# Patient Record
Sex: MALE | Race: WHITE | NOT HISPANIC OR LATINO
[De-identification: names, ages, dates, MRNs, and addresses within clinical notes are randomized per-mention and may not be internally consistent; named-entity substitution may affect disease eponyms.]

---

## 2017-05-20 ENCOUNTER — TRANSCRIPTION ENCOUNTER (OUTPATIENT)
Age: 57
End: 2017-05-20

## 2017-12-19 ENCOUNTER — OTHER (OUTPATIENT)
Age: 57
End: 2017-12-19

## 2017-12-22 ENCOUNTER — RECORD ABSTRACTING (OUTPATIENT)
Age: 57
End: 2017-12-22

## 2017-12-22 ENCOUNTER — APPOINTMENT (OUTPATIENT)
Dept: INTERNAL MEDICINE | Facility: CLINIC | Age: 57
End: 2017-12-22
Payer: COMMERCIAL

## 2017-12-22 VITALS
OXYGEN SATURATION: 95 % | SYSTOLIC BLOOD PRESSURE: 138 MMHG | HEART RATE: 58 BPM | WEIGHT: 222 LBS | DIASTOLIC BLOOD PRESSURE: 78 MMHG | BODY MASS INDEX: 31.78 KG/M2 | TEMPERATURE: 98 F | RESPIRATION RATE: 18 BRPM | HEIGHT: 70 IN

## 2017-12-22 DIAGNOSIS — Z87.891 PERSONAL HISTORY OF NICOTINE DEPENDENCE: ICD-10-CM

## 2017-12-22 DIAGNOSIS — J45.909 UNSPECIFIED ASTHMA, UNCOMPLICATED: ICD-10-CM

## 2017-12-22 DIAGNOSIS — E66.9 OBESITY, UNSPECIFIED: ICD-10-CM

## 2017-12-22 DIAGNOSIS — M25.511 PAIN IN RIGHT SHOULDER: ICD-10-CM

## 2017-12-22 DIAGNOSIS — M54.9 DORSALGIA, UNSPECIFIED: ICD-10-CM

## 2017-12-22 DIAGNOSIS — Z72.0 TOBACCO USE: ICD-10-CM

## 2017-12-22 DIAGNOSIS — Z84.89 FAMILY HISTORY OF OTHER SPECIFIED CONDITIONS: ICD-10-CM

## 2017-12-22 DIAGNOSIS — Z82.3 FAMILY HISTORY OF STROKE: ICD-10-CM

## 2017-12-22 DIAGNOSIS — R09.02 HYPOXEMIA: ICD-10-CM

## 2017-12-22 PROCEDURE — 99396 PREV VISIT EST AGE 40-64: CPT | Mod: 25

## 2017-12-22 PROCEDURE — 90686 IIV4 VACC NO PRSV 0.5 ML IM: CPT | Mod: GA

## 2017-12-22 PROCEDURE — G0008: CPT

## 2018-01-10 LAB
CHOLEST SERPL-MCNC: 183
GLUCOSE SERPL-MCNC: 104
HDLC SERPL-MCNC: 36
LDLC SERPL CALC-MCNC: 130
PSA SERPL-MCNC: 0.3

## 2018-06-22 ENCOUNTER — NON-APPOINTMENT (OUTPATIENT)
Age: 58
End: 2018-06-22

## 2018-06-22 ENCOUNTER — APPOINTMENT (OUTPATIENT)
Dept: INTERNAL MEDICINE | Facility: CLINIC | Age: 58
End: 2018-06-22
Payer: COMMERCIAL

## 2018-06-22 VITALS
HEART RATE: 55 BPM | WEIGHT: 220 LBS | TEMPERATURE: 98.4 F | RESPIRATION RATE: 18 BRPM | HEIGHT: 70 IN | BODY MASS INDEX: 31.5 KG/M2 | SYSTOLIC BLOOD PRESSURE: 112 MMHG | OXYGEN SATURATION: 97 % | DIASTOLIC BLOOD PRESSURE: 68 MMHG

## 2018-06-22 DIAGNOSIS — J98.01 ACUTE BRONCHOSPASM: ICD-10-CM

## 2018-06-22 DIAGNOSIS — R00.2 PALPITATIONS: ICD-10-CM

## 2018-06-22 DIAGNOSIS — J98.4 OTHER DISORDERS OF LUNG: ICD-10-CM

## 2018-06-22 PROCEDURE — 93000 ELECTROCARDIOGRAM COMPLETE: CPT

## 2018-06-22 PROCEDURE — 94060 EVALUATION OF WHEEZING: CPT

## 2018-06-22 PROCEDURE — 94729 DIFFUSING CAPACITY: CPT

## 2018-06-22 PROCEDURE — ZZZZZ: CPT

## 2018-06-22 PROCEDURE — 94727 GAS DIL/WSHOT DETER LNG VOL: CPT

## 2018-06-22 PROCEDURE — 99214 OFFICE O/P EST MOD 30 MIN: CPT | Mod: 25

## 2018-06-22 RX ORDER — HYDROCORTISONE 1 %
100 MCG OINTMENT (GRAM) TOPICAL
Refills: 0 | Status: DISCONTINUED | COMMUNITY
End: 2018-06-22

## 2018-06-22 NOTE — PLAN
[FreeTextEntry1] : 1. Continue to observe without medications at this time.\par \par 2. Continue with cardiovascular exercise regimen.\par \par 3. The patient has been referred to Dr. Palla for a baseline cardiology consultation and assessment due to the fact that he has a strong family history of coronary artery disease. Patient's father had quadruple bypass performed in his early 60s.\par \par 4. The patient will call the office in July to assess the availability of the new shingles vaccine.\par \par 5. Follow up with myself in 6 months with a wellness evaluation. He will undergo all yearly routine fasting blood work several days prior to that visit.

## 2018-06-22 NOTE — DATA REVIEWED
[FreeTextEntry1] : A pulmonary function test is performed. Lung volumes and flow rates are within normal limits. Bronchodilator reactivity is not demonstrated. The DLCO and saturation maintained. This represents normal physiologic function.\par \par EKG shows sinus bradycardia rate of 55. Normal intervals. There is a slight left axis deviation. Nonspecific T-wave flattening is noted. There were no acute changes seen.\par \par

## 2018-06-22 NOTE — PHYSICAL EXAM
[General Appearance - Alert] : alert [General Appearance - In No Acute Distress] : in no acute distress [Outer Ear] : the ears and nose were normal in appearance [Oropharynx] : the oropharynx was normal [FreeTextEntry1] : There is significant cerumen noted in the right external auditory canal [Neck Appearance] : the appearance of the neck was normal [Neck Cervical Mass (___cm)] : no neck mass was observed [Jugular Venous Distention Increased] : there was no jugular-venous distention [Thyroid Diffuse Enlargement] : the thyroid was not enlarged [Thyroid Nodule] : there were no palpable thyroid nodules [Auscultation Breath Sounds / Voice Sounds] : lungs were clear to auscultation bilaterally [Heart Rate And Rhythm] : heart rate was normal and rhythm regular [Heart Sounds] : normal S1 and S2 [Heart Sounds Gallop] : no gallops [Murmurs] : no murmurs [Heart Sounds Pericardial Friction Rub] : no pericardial rub [Arterial Pulses Carotid] : carotid pulses were normal with no bruits [Edema] : there was no peripheral edema [Bowel Sounds] : normal bowel sounds [Abdomen Soft] : soft [Abdomen Tenderness] : non-tender [] : no hepato-splenomegaly [Abdomen Mass (___ Cm)] : no abdominal mass palpated [Cervical Lymph Nodes Enlarged Posterior Bilaterally] : posterior cervical [Cervical Lymph Nodes Enlarged Anterior Bilaterally] : anterior cervical [Supraclavicular Lymph Nodes Enlarged Bilaterally] : supraclavicular [Nail Clubbing] : no clubbing  or cyanosis of the fingernails

## 2018-07-01 ENCOUNTER — TRANSCRIPTION ENCOUNTER (OUTPATIENT)
Age: 58
End: 2018-07-01

## 2018-07-06 ENCOUNTER — TRANSCRIPTION ENCOUNTER (OUTPATIENT)
Age: 58
End: 2018-07-06

## 2018-08-13 ENCOUNTER — TRANSCRIPTION ENCOUNTER (OUTPATIENT)
Age: 58
End: 2018-08-13

## 2019-01-11 ENCOUNTER — APPOINTMENT (OUTPATIENT)
Dept: INTERNAL MEDICINE | Facility: CLINIC | Age: 59
End: 2019-01-11

## 2019-05-28 ENCOUNTER — TRANSCRIPTION ENCOUNTER (OUTPATIENT)
Age: 59
End: 2019-05-28

## 2019-08-19 ENCOUNTER — APPOINTMENT (OUTPATIENT)
Dept: INTERNAL MEDICINE | Facility: CLINIC | Age: 59
End: 2019-08-19

## 2019-09-02 PROBLEM — Z84.89 FAMILY HISTORY OF DEATH OF UNKNOWN CAUSE: Status: ACTIVE | Noted: 2017-12-22

## 2019-09-13 ENCOUNTER — APPOINTMENT (OUTPATIENT)
Dept: INTERNAL MEDICINE | Facility: CLINIC | Age: 59
End: 2019-09-13
Payer: COMMERCIAL

## 2019-09-13 VITALS
RESPIRATION RATE: 16 BRPM | BODY MASS INDEX: 30.78 KG/M2 | DIASTOLIC BLOOD PRESSURE: 62 MMHG | HEIGHT: 70 IN | TEMPERATURE: 98.6 F | HEART RATE: 54 BPM | OXYGEN SATURATION: 96 % | WEIGHT: 215 LBS | SYSTOLIC BLOOD PRESSURE: 114 MMHG

## 2019-09-13 DIAGNOSIS — M77.9 ENTHESOPATHY, UNSPECIFIED: ICD-10-CM

## 2019-09-13 DIAGNOSIS — Z23 ENCOUNTER FOR IMMUNIZATION: ICD-10-CM

## 2019-09-13 PROCEDURE — 99396 PREV VISIT EST AGE 40-64: CPT | Mod: 25

## 2019-09-13 PROCEDURE — 90686 IIV4 VACC NO PRSV 0.5 ML IM: CPT

## 2019-09-13 PROCEDURE — G0008: CPT

## 2019-09-13 NOTE — HISTORY OF PRESENT ILLNESS
[de-identified] : The patient comes in today for a wellness evaluation. \par \par Overall, the patient is stable at this time. He is not taking any medications. He states that he is attempting to exercise on a fairly regular basis. He has lost 5 pounds since last visit. He denies any chest pains or cardiovascular symptoms while exercising. There are no fevers or night sweats. He does not have any cough or sputum production. There is no overt wheezing. His appetite is good. There is no change in bowel habits. \par \par The patient has been complaining of discomfort to his right arm localized to the flexor tendon that started a couple weeks ago. He notes that the onset of the pain was with flexion of the arm inwards. He now comes in for this assessment. \par \par

## 2019-09-13 NOTE — PHYSICAL EXAM
[General Appearance - Alert] : alert [General Appearance - In No Acute Distress] : in no acute distress [Sclera] : the sclera and conjunctiva were normal [PERRL With Normal Accommodation] : pupils were equal in size, round, and reactive to light [Extraocular Movements] : extraocular movements were intact [Outer Ear] : the ears and nose were normal in appearance [Oropharynx] : the oropharynx was normal [Neck Appearance] : the appearance of the neck was normal [Neck Cervical Mass (___cm)] : no neck mass was observed [Thyroid Nodule] : there were no palpable thyroid nodules [Jugular Venous Distention Increased] : there was no jugular-venous distention [Thyroid Diffuse Enlargement] : the thyroid was not enlarged [Auscultation Breath Sounds / Voice Sounds] : lungs were clear to auscultation bilaterally [Heart Rate And Rhythm] : heart rate was normal and rhythm regular [Heart Sounds] : normal S1 and S2 [Heart Sounds Gallop] : no gallops [Murmurs] : no murmurs [Heart Sounds Pericardial Friction Rub] : no pericardial rub [Arterial Pulses Carotid] : carotid pulses were normal with no bruits [Edema] : there was no peripheral edema [Bowel Sounds] : normal bowel sounds [Abdomen Tenderness] : non-tender [Abdomen Soft] : soft [Abdomen Mass (___ Cm)] : no abdominal mass palpated [Normal Sphincter Tone] : normal sphincter tone [No Rectal Mass] : no rectal mass [Prostate Enlargement] : the prostate was not enlarged [Prostate Tenderness] : the prostate was not tender [Cervical Lymph Nodes Enlarged Posterior Bilaterally] : posterior cervical [Cervical Lymph Nodes Enlarged Anterior Bilaterally] : anterior cervical [No CVA Tenderness] : no ~M costovertebral angle tenderness [Supraclavicular Lymph Nodes Enlarged Bilaterally] : supraclavicular [Nail Clubbing] : no clubbing  or cyanosis of the fingernails [] : no rash [Occult Blood Positive] : stool was negative for occult blood [FreeTextEntry1] : Seborrheic keratoses are present. There are no suspicious looking hyperpigmented nevi.

## 2019-09-13 NOTE — HEALTH RISK ASSESSMENT
[Very Good] : ~his/her~  mood as very good [Yes] : Yes [Monthly or less (1 pt)] : Monthly or less (1 point) [1 or 2 (0 pts)] : 1 or 2 (0 points) [Never (0 pts)] : Never (0 points) [No] : In the past 12 months have you used drugs other than those required for medical reasons? No [No falls in past year] : Patient reported no falls in the past year [0] : 2) Feeling down, depressed, or hopeless: Not at all (0) [Patient reported colonoscopy was normal] : Patient reported colonoscopy was normal [With Significant Other] : lives with significant other [None] : None [Employed] : employed [] :  [Feels Safe at Home] : Feels safe at home [Fully functional (bathing, dressing, toileting, transferring, walking, feeding)] : Fully functional (bathing, dressing, toileting, transferring, walking, feeding) [Fully functional (using the telephone, shopping, preparing meals, housekeeping, doing laundry, using] : Fully functional and needs no help or supervision to perform IADLs (using the telephone, shopping, preparing meals, housekeeping, doing laundry, using transportation, managing medications and managing finances) [Reports normal functional visual acuity (ie: able to read med bottle)] : Reports normal functional visual acuity [Smoke Detector] : smoke detector [Carbon Monoxide Detector] : carbon monoxide detector [Guns at Home] : guns at home [Seat Belt] :  uses seat belt [Sunscreen] : uses sunscreen [] : No [de-identified] : former smoker  [YearQuit] : 2009 [de-identified] : occasional  [Change in mental status noted] : No change in mental status noted [Language] : denies difficulty with language [Behavior] : denies difficulty with behavior [Learning/Retaining New Information] : denies difficulty learning/retaining new information [Handling Complex Tasks] : denies difficulty handling complex tasks [Reasoning] : denies difficulty with reasoning [Spatial Ability and Orientation] : denies difficulty with spatial ability and orientation [Reports changes in hearing] : Reports no changes in hearing [Reports changes in vision] : Reports no changes in vision [Reports changes in dental health] : Reports no changes in dental health [Travel to Developing Areas] : does not  travel to developing areas [TB Exposure] : is not being exposed to tuberculosis [Caregiver Concerns] : does not have caregiver concerns [ColonoscopyDate] : 01/15 [ColonoscopyComments] : with Dr. 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

## 2019-09-13 NOTE — ADDENDUM
I saw and evaluated the patient, performing the key elements of the service. I discussed the findings, assessment and plan with the resident and agree with the resident's findings and plan as documented in the resident's note. [FreeTextEntry1] : I, Usama Tirado, acted solely as a scribe for Dr. Hughes on this date 09/13/2019.

## 2019-09-13 NOTE — END OF VISIT
[FreeTextEntry3] : I, Usama Tirado, acted solely as a scribe for Dr. Danny Hughes MD on this date 09/13/2019. \par \par All medical records entries made by the Scribe were at my, Dr. Danny Hughes MD, direction and personally dictated by me on 09/13/2019. I have personally reviewed the chart and agree that the record accurately reflects my personal performance of the history, physical exam, assessment, and plan. I have also personally directed, reviewed, and agreed with the chart.

## 2019-09-13 NOTE — PLAN
[FreeTextEntry1] : 1. Continue to observe without medications at this time.\par \par 2. Flu shot was given today.\par \par 3. The new shingles vaccine has been recommended. He will call his insurance company to see if it's covered.\par \par 4. For the arm pain, I recommended to take two Aleve tablets with food as needed for 10 days. I recommended to minimize activity with the right upper extremity. \par \par 5. Continue with cardiovascular exercise regimen.\par \par 6. The patient is due for a colonoscopy with gastroenterologist Dr. Amanda.\par \par 7. The patient was referred to dermatologist Dr. Kirkpatrick or Dr. Polk. \par \par 8. The patient was referred to cardiologist Dr. Francisco for consultation and assessment due to the fact that he has a strong family history of coronary artery disease.\par \par 9. The patient was given a prescription for Carotid Duplex Dopplers to evaluate for the presence of atherosclerotic disease. He has risk factors including a strong family history of coronary artery disease. \par \par 10. Follow up with myself in a year with a wellness evaluation. He will undergo all yearly routine fasting blood work several days prior to that visit.

## 2019-10-04 ENCOUNTER — TRANSCRIPTION ENCOUNTER (OUTPATIENT)
Age: 59
End: 2019-10-04

## 2020-06-01 ENCOUNTER — APPOINTMENT (OUTPATIENT)
Dept: INTERNAL MEDICINE | Facility: CLINIC | Age: 60
End: 2020-06-01
Payer: COMMERCIAL

## 2020-06-01 VITALS
SYSTOLIC BLOOD PRESSURE: 112 MMHG | BODY MASS INDEX: 32.78 KG/M2 | HEIGHT: 70 IN | HEART RATE: 57 BPM | OXYGEN SATURATION: 97 % | TEMPERATURE: 97.7 F | DIASTOLIC BLOOD PRESSURE: 70 MMHG | RESPIRATION RATE: 18 BRPM | WEIGHT: 229 LBS

## 2020-06-01 DIAGNOSIS — J30.9 ALLERGIC RHINITIS, UNSPECIFIED: ICD-10-CM

## 2020-06-01 DIAGNOSIS — J45.20 MILD INTERMITTENT ASTHMA, UNCOMPLICATED: ICD-10-CM

## 2020-06-01 PROCEDURE — 99214 OFFICE O/P EST MOD 30 MIN: CPT

## 2020-06-01 NOTE — HISTORY OF PRESENT ILLNESS
[FreeTextEntry8] : Sinus congestion.\par Developed sinus congestion several weeks ago.  He does have some allergy issues in the spring but usually not this bad.  He denies cough, fevers, wheeze.  Taking OTC Allergy med without effect.  He has been using a nasal decongestant frequently.

## 2020-06-01 NOTE — PHYSICAL EXAM
[Normal Oropharynx] : the oropharynx was normal [Normal Outer Ear/Nose] : the outer ears and nose were normal in appearance [No Edema] : there was no peripheral edema [Normal] : affect was normal and insight and judgment were intact

## 2020-06-01 NOTE — ASSESSMENT
[FreeTextEntry1] : \par Start prednisone 20 mg bid x 7 days. with food\par Doxycycline- avoid sun\par Fluticasone NS qd.  Stop OTC nasal decongestant.  \par \par Call if not improved.

## 2020-08-10 ENCOUNTER — RX RENEWAL (OUTPATIENT)
Age: 60
End: 2020-08-10

## 2020-10-08 ENCOUNTER — LABORATORY RESULT (OUTPATIENT)
Age: 60
End: 2020-10-08

## 2020-10-12 ENCOUNTER — APPOINTMENT (OUTPATIENT)
Dept: INTERNAL MEDICINE | Facility: CLINIC | Age: 60
End: 2020-10-12
Payer: COMMERCIAL

## 2020-10-12 VITALS
RESPIRATION RATE: 18 BRPM | BODY MASS INDEX: 32.64 KG/M2 | DIASTOLIC BLOOD PRESSURE: 78 MMHG | HEART RATE: 62 BPM | WEIGHT: 228 LBS | HEIGHT: 70 IN | OXYGEN SATURATION: 97 % | SYSTOLIC BLOOD PRESSURE: 118 MMHG | TEMPERATURE: 97.7 F

## 2020-10-12 PROCEDURE — 90686 IIV4 VACC NO PRSV 0.5 ML IM: CPT

## 2020-10-12 PROCEDURE — 99396 PREV VISIT EST AGE 40-64: CPT | Mod: 25

## 2020-10-12 PROCEDURE — G0008: CPT

## 2020-10-12 RX ORDER — DOXYCYCLINE HYCLATE 100 MG/1
100 TABLET ORAL TWICE DAILY
Qty: 14 | Refills: 0 | Status: DISCONTINUED | COMMUNITY
Start: 2020-06-01 | End: 2020-10-12

## 2020-10-12 RX ORDER — PREDNISONE 20 MG/1
20 TABLET ORAL
Qty: 14 | Refills: 0 | Status: DISCONTINUED | COMMUNITY
Start: 2020-06-01 | End: 2020-10-12

## 2020-10-12 NOTE — HISTORY OF PRESENT ILLNESS
[de-identified] : The patient comes in today for a wellness evaluation.\par \par Overall, the patient has been relatively stable, since last being seen by myself one year ago. He did have a sinus infection in June. He was treated with fluticasone nasal spray with significant improvement. He has continued on this medication due to the fact that he has had a positive response.\par \par The patient has not been exercising recently, due to the current corona virus epidemic. Lesions have been closed. He has gained approximately 8 pounds. He does snore fairly regularly according to his wife. His next comfort to 17-1/2 inches. He never went for his overnight polysomnogram.\par \par The patient did undergo colonoscopy 2 weeks ago. The study did not reveal any polyps. He denies any change in bowel habits. There is no chest pain. He still has not undergone a cardiology evaluation. He does have a strong family history. He did not followup with dermatology. He also did not undergo the shingles vaccine. He now comes in for this assessment.

## 2020-10-12 NOTE — PHYSICAL EXAM
[General Appearance - Alert] : alert [General Appearance - In No Acute Distress] : in no acute distress [Sclera] : the sclera and conjunctiva were normal [PERRL With Normal Accommodation] : pupils were equal in size, round, and reactive to light [Extraocular Movements] : extraocular movements were intact [Outer Ear] : the ears and nose were normal in appearance [Oropharynx] : the oropharynx was normal [Neck Appearance] : the appearance of the neck was normal [Neck Cervical Mass (___cm)] : no neck mass was observed [Jugular Venous Distention Increased] : there was no jugular-venous distention [Thyroid Diffuse Enlargement] : the thyroid was not enlarged [Thyroid Nodule] : there were no palpable thyroid nodules [Auscultation Breath Sounds / Voice Sounds] : lungs were clear to auscultation bilaterally [Heart Rate And Rhythm] : heart rate was normal and rhythm regular [Heart Sounds] : normal S1 and S2 [Heart Sounds Gallop] : no gallops [Murmurs] : no murmurs [Heart Sounds Pericardial Friction Rub] : no pericardial rub [Arterial Pulses Carotid] : carotid pulses were normal with no bruits [Edema] : there was no peripheral edema [Bowel Sounds] : normal bowel sounds [Abdomen Soft] : soft [Abdomen Tenderness] : non-tender [Abdomen Mass (___ Cm)] : no abdominal mass palpated [Normal Sphincter Tone] : normal sphincter tone [No Rectal Mass] : no rectal mass [Occult Blood Positive] : stool was negative for occult blood [Prostate Enlargement] : the prostate was not enlarged [Prostate Tenderness] : the prostate was not tender [Cervical Lymph Nodes Enlarged Posterior Bilaterally] : posterior cervical [Cervical Lymph Nodes Enlarged Anterior Bilaterally] : anterior cervical [Supraclavicular Lymph Nodes Enlarged Bilaterally] : supraclavicular [No CVA Tenderness] : no ~M costovertebral angle tenderness [Nail Clubbing] : no clubbing  or cyanosis of the fingernails [] : no rash [FreeTextEntry1] : Seborrheic keratoses are present. There are no suspicious looking hyperpigmented nevi.

## 2020-10-12 NOTE — PLAN
[FreeTextEntry1] : 1. Continued medication as outlined above.\par \par 2. The patient now agrees to undergo an overnight polysomnography to evaluate for possible sleep apnea.\par \par 3. The patient will now once again be scheduled for carotid duplex studies as this still has not yet been performed. The patient does have risk factors.\par \par 4. The patient has been referred for a baseline cardiology evaluation with Dr. Francisco. \par \par 5. Dermatologic assessment with Dr. Kirkpatrick\par \par 6. The patient once again we'll attempt to look into the coverage for the new shingles vaccine. He will then decide upon administration\par \par 7. Flu shot has been administered\par \par 8. Followup in one year with a wellness evaluation, routine blood work, flu shot, and Pneumovax 23.

## 2020-10-12 NOTE — HEALTH RISK ASSESSMENT
[Yes] : Yes [2 - 3 times a week (3 pts)] : 2 - 3  times a week (3 points) [3 or 4 (1 pt)] : 3 or 4  (1 point) [Never (0 pts)] : Never (0 points) [No] : In the past 12 months have you used drugs other than those required for medical reasons? No [0] : 2) Feeling down, depressed, or hopeless: Not at all (0) [Employed] : employed [Smoke Detector] : smoke detector [Carbon Monoxide Detector] : carbon monoxide detector [Safety elements used in home] : safety elements used in home [Seat Belt] :  uses seat belt [Sunscreen] : uses sunscreen [] : No [de-identified] : former  [YearQuit] : 2010 [Guns at Home] : no guns at home [ColonoscopyDate] : 09/20

## 2020-11-23 ENCOUNTER — MED ADMIN CHARGE (OUTPATIENT)
Age: 60
End: 2020-11-23

## 2020-11-23 ENCOUNTER — APPOINTMENT (OUTPATIENT)
Dept: INTERNAL MEDICINE | Facility: CLINIC | Age: 60
End: 2020-11-23
Payer: COMMERCIAL

## 2020-11-23 PROCEDURE — 90715 TDAP VACCINE 7 YRS/> IM: CPT

## 2020-11-23 PROCEDURE — 90471 IMMUNIZATION ADMIN: CPT

## 2021-01-14 ENCOUNTER — APPOINTMENT (OUTPATIENT)
Dept: INTERNAL MEDICINE | Facility: CLINIC | Age: 61
End: 2021-01-14
Payer: COMMERCIAL

## 2021-01-14 PROCEDURE — 99442: CPT

## 2021-01-14 RX ORDER — CEFUROXIME AXETIL 500 MG/1
500 TABLET ORAL
Qty: 14 | Refills: 0 | Status: COMPLETED | COMMUNITY
Start: 2021-01-14 | End: 2021-01-21

## 2021-01-14 NOTE — HISTORY OF PRESENT ILLNESS
[Other Location: e.g. School (Enter Location, City,State)___] : at [unfilled], at the time of the visit. [Medical Office: ([facility])___] : at the medical office located in  [Verbal consent obtained from patient] : the patient, [unfilled] [FreeTextEntry8] : Possible sinus infection\par Started week ago with nasal congestion,yellow discharge and sinus tenderness.  NO fvers, rare cough,noSOB or wheeze.  Denies COVID contacts.  Using Flonase.Feels like similar Sinus infections.

## 2021-01-18 ENCOUNTER — APPOINTMENT (OUTPATIENT)
Dept: INTERNAL MEDICINE | Facility: CLINIC | Age: 61
End: 2021-01-18

## 2021-10-12 ENCOUNTER — LABORATORY RESULT (OUTPATIENT)
Age: 61
End: 2021-10-12

## 2021-10-14 LAB
25(OH)D3 SERPL-MCNC: 41 NG/ML
ALBUMIN SERPL ELPH-MCNC: 4.9 G/DL
ALP BLD-CCNC: 67 U/L
ALT SERPL-CCNC: 23 U/L
ANION GAP SERPL CALC-SCNC: 13 MMOL/L
APPEARANCE: CLEAR
AST SERPL-CCNC: 19 U/L
BACTERIA: NEGATIVE
BASOPHILS # BLD AUTO: 0.04 K/UL
BASOPHILS NFR BLD AUTO: 0.5 %
BILIRUB SERPL-MCNC: 0.8 MG/DL
BILIRUBIN URINE: NEGATIVE
BLOOD URINE: NEGATIVE
BUN SERPL-MCNC: 15 MG/DL
CALCIUM SERPL-MCNC: 9.7 MG/DL
CHLORIDE SERPL-SCNC: 100 MMOL/L
CHOLEST SERPL-MCNC: 200 MG/DL
CO2 SERPL-SCNC: 26 MMOL/L
COLOR: YELLOW
CREAT SERPL-MCNC: 1.17 MG/DL
EOSINOPHIL # BLD AUTO: 0.13 K/UL
EOSINOPHIL NFR BLD AUTO: 1.7 %
GLUCOSE QUALITATIVE U: NEGATIVE
GLUCOSE SERPL-MCNC: 114 MG/DL
HCT VFR BLD CALC: 46.7 %
HDLC SERPL-MCNC: 44 MG/DL
HGB BLD-MCNC: 15.9 G/DL
HYALINE CASTS: 0 /LPF
IMM GRANULOCYTES NFR BLD AUTO: 0.4 %
KETONES URINE: NEGATIVE
LDLC SERPL CALC-MCNC: 135 MG/DL
LEUKOCYTE ESTERASE URINE: NEGATIVE
LYMPHOCYTES # BLD AUTO: 1.33 K/UL
LYMPHOCYTES NFR BLD AUTO: 17.4 %
MAN DIFF?: NORMAL
MCHC RBC-ENTMCNC: 29.7 PG
MCHC RBC-ENTMCNC: 34 GM/DL
MCV RBC AUTO: 87.1 FL
MICROSCOPIC-UA: NORMAL
MONOCYTES # BLD AUTO: 0.72 K/UL
MONOCYTES NFR BLD AUTO: 9.4 %
NEUTROPHILS # BLD AUTO: 5.38 K/UL
NEUTROPHILS NFR BLD AUTO: 70.6 %
NITRITE URINE: NEGATIVE
NONHDLC SERPL-MCNC: 156 MG/DL
PH URINE: 6
PLATELET # BLD AUTO: 280 K/UL
POTASSIUM SERPL-SCNC: 4.3 MMOL/L
PROT SERPL-MCNC: 6.9 G/DL
PROTEIN URINE: NORMAL
PSA SERPL-MCNC: 0.32 NG/ML
RBC # BLD: 5.36 M/UL
RBC # FLD: 12.9 %
RED BLOOD CELLS URINE: 2 /HPF
SODIUM SERPL-SCNC: 139 MMOL/L
SPECIFIC GRAVITY URINE: 1.02
SQUAMOUS EPITHELIAL CELLS: 0 /HPF
T3FREE SERPL-MCNC: 3.25 PG/ML
T3RU NFR SERPL: 1.1 TBI
T4 FREE SERPL-MCNC: 1 NG/DL
T4 SERPL-MCNC: 5.2 UG/DL
TRIGL SERPL-MCNC: 104 MG/DL
TSH SERPL-ACNC: 1.63 UIU/ML
UROBILINOGEN URINE: NORMAL
WBC # FLD AUTO: 7.63 K/UL
WHITE BLOOD CELLS URINE: 1 /HPF

## 2021-10-15 ENCOUNTER — APPOINTMENT (OUTPATIENT)
Dept: CARDIOLOGY | Facility: CLINIC | Age: 61
End: 2021-10-15
Payer: COMMERCIAL

## 2021-10-15 ENCOUNTER — NON-APPOINTMENT (OUTPATIENT)
Age: 61
End: 2021-10-15

## 2021-10-15 ENCOUNTER — APPOINTMENT (OUTPATIENT)
Dept: INTERNAL MEDICINE | Facility: CLINIC | Age: 61
End: 2021-10-15
Payer: COMMERCIAL

## 2021-10-15 VITALS
WEIGHT: 227 LBS | BODY MASS INDEX: 32.5 KG/M2 | SYSTOLIC BLOOD PRESSURE: 133 MMHG | OXYGEN SATURATION: 96 % | DIASTOLIC BLOOD PRESSURE: 83 MMHG | HEART RATE: 58 BPM | HEIGHT: 70 IN

## 2021-10-15 VITALS
SYSTOLIC BLOOD PRESSURE: 128 MMHG | OXYGEN SATURATION: 96 % | WEIGHT: 225 LBS | DIASTOLIC BLOOD PRESSURE: 82 MMHG | TEMPERATURE: 98.2 F | HEIGHT: 70 IN | RESPIRATION RATE: 16 BRPM | BODY MASS INDEX: 32.21 KG/M2 | HEART RATE: 60 BPM

## 2021-10-15 DIAGNOSIS — Z91.89 OTHER SPECIFIED PERSONAL RISK FACTORS, NOT ELSEWHERE CLASSIFIED: ICD-10-CM

## 2021-10-15 DIAGNOSIS — R94.31 ABNORMAL ELECTROCARDIOGRAM [ECG] [EKG]: ICD-10-CM

## 2021-10-15 DIAGNOSIS — R06.00 DYSPNEA, UNSPECIFIED: ICD-10-CM

## 2021-10-15 PROCEDURE — 99204 OFFICE O/P NEW MOD 45 MIN: CPT

## 2021-10-15 PROCEDURE — 93000 ELECTROCARDIOGRAM COMPLETE: CPT

## 2021-10-15 PROCEDURE — G0009: CPT

## 2021-10-15 PROCEDURE — 90472 IMMUNIZATION ADMIN EACH ADD: CPT

## 2021-10-15 PROCEDURE — 90732 PPSV23 VACC 2 YRS+ SUBQ/IM: CPT

## 2021-10-15 PROCEDURE — 90686 IIV4 VACC NO PRSV 0.5 ML IM: CPT

## 2021-10-15 PROCEDURE — 99396 PREV VISIT EST AGE 40-64: CPT | Mod: 25

## 2021-10-15 RX ORDER — FLUTICASONE PROPIONATE 50 UG/1
50 SPRAY, METERED NASAL DAILY
Qty: 16 | Refills: 1 | Status: DISCONTINUED | COMMUNITY
Start: 2020-06-01 | End: 2021-10-15

## 2021-10-15 NOTE — HISTORY OF PRESENT ILLNESS
[FreeTextEntry1] : The patient comes in for a yearly wellness evaluation\par  [de-identified] : The patient states, and overall, he is doing relatively well. Unfortunately, he has not followed through with any of the recommendations that I have been taking over the past several years. He did, however, just see a cardiologist earlier today, due to a very strong family history of early coronary artery disease. He was evaluated by Dr. Francisco. He is attempting to set up a coronary CT angiogram for the patient. He is asymptomatic at present. He has not been exercising. He has not been able to lose weight.\par \par The patient has received vaccination for the corona virus. He is asymptomatic at present. He still did not undergo an overnight polysomnogram to evaluate for sleep apnea. He now comes in for this assessment.

## 2021-10-15 NOTE — REVIEW OF SYSTEMS
[Weight Gain (___ Lbs)] : [unfilled] ~Ulb weight gain [Feeling Fatigued] : feeling fatigued [Dyspnea on exertion] : dyspnea during exertion [Negative] : Heme/Lymph

## 2021-10-15 NOTE — PHYSICAL EXAM
[No Acute Distress] : no acute distress [Obese] : obese [Normal S1, S2] : normal S1, S2 [No Murmur] : no murmur [Clear Lung Fields] : clear lung fields [Soft] : abdomen soft [Normal Gait] : normal gait [No Edema] : no edema [No Rash] : no rash [Moves all extremities] : moves all extremities [Alert and Oriented] : alert and oriented [de-identified] : Round pupils [de-identified] : Wearing a face mask [de-identified] : No JVD

## 2021-10-15 NOTE — HISTORY OF PRESENT ILLNESS
[FreeTextEntry1] : Zeb Mata is a 60-year-old man with obesity, former tobacco use, untreated hyperlipidemia, and family history of premature and severe coronary artery disease in multiple male relatives (including his father, brother, and paternal uncle) who presents for cardiology consultation because of fatigue and concern for coronary artery disease.  He describes a sedentary lifestyle for the past 1.5 years; he has not been experiencing typical angina but describes recent onset of fatigue and exertional dyspnea.  He denies previous assessments for coronary artery disease.

## 2021-10-15 NOTE — DISCUSSION/SUMMARY
[FreeTextEntry1] : Zeb Mata is a 60-year-old man with multiple risk factors for heart disease (including family history of premature CAD in multiple canal first degree relatives [requiring revascularization]) who is experiencing fatigue and exertional dyspnea in the setting of nonspecific ECG abnormality.  I recommend CT coronaries with calcium scoring to further evaluate his symptoms.  In addition, I recommend aggressive risk factor modification and would favor statin therapy (especially if coronary CT is abnormal), lifestyle modification, and weight loss. I will contact him by telephone to discuss results of imaging when available.

## 2021-10-15 NOTE — CARDIOLOGY SUMMARY
[de-identified] : 10/15/21.  Sinus  Rhythm.  Mild, nonspecific ST depression.  Early R wave transition (V2)

## 2021-10-15 NOTE — PHYSICAL EXAM
[General Appearance - Alert] : alert [General Appearance - In No Acute Distress] : in no acute distress [Sclera] : the sclera and conjunctiva were normal [PERRL With Normal Accommodation] : pupils were equal in size, round, and reactive to light [Extraocular Movements] : extraocular movements were intact [Outer Ear] : the ears and nose were normal in appearance [Oropharynx] : the oropharynx was normal [Neck Cervical Mass (___cm)] : no neck mass was observed [Neck Appearance] : the appearance of the neck was normal [Jugular Venous Distention Increased] : there was no jugular-venous distention [Thyroid Diffuse Enlargement] : the thyroid was not enlarged [Thyroid Nodule] : there were no palpable thyroid nodules [Auscultation Breath Sounds / Voice Sounds] : lungs were clear to auscultation bilaterally [Heart Rate And Rhythm] : heart rate was normal and rhythm regular [Heart Sounds Gallop] : no gallops [Heart Sounds] : normal S1 and S2 [Murmurs] : no murmurs [Heart Sounds Pericardial Friction Rub] : no pericardial rub [Arterial Pulses Carotid] : carotid pulses were normal with no bruits [Bowel Sounds] : normal bowel sounds [Edema] : there was no peripheral edema [Abdomen Soft] : soft [Abdomen Tenderness] : non-tender [Abdomen Mass (___ Cm)] : no abdominal mass palpated [Normal Sphincter Tone] : normal sphincter tone [No Rectal Mass] : no rectal mass [Occult Blood Positive] : stool was negative for occult blood [Prostate Enlargement] : the prostate was not enlarged [Prostate Tenderness] : the prostate was not tender [Cervical Lymph Nodes Enlarged Posterior Bilaterally] : posterior cervical [Cervical Lymph Nodes Enlarged Anterior Bilaterally] : anterior cervical [Supraclavicular Lymph Nodes Enlarged Bilaterally] : supraclavicular [No CVA Tenderness] : no ~M costovertebral angle tenderness [Nail Clubbing] : no clubbing  or cyanosis of the fingernails [] : no rash [FreeTextEntry1] : Seborrheic keratoses are present. There are no suspicious looking hyperpigmented nevi.

## 2021-10-15 NOTE — PLAN
[FreeTextEntry1] : 1. Continue with medication as outlined above which only includes Flonase at this time.\par \par 2. Await the results of the noninvasive cardiac workup that is tentatively been scheduled.\par \par 3. The patient still needs to undergo overnight polysomnogram to evaluate for sleep apnea.\par \par 4. Carotid duplex studies also still need to be performed\par \par 5. Routine dermatologic follow up with Dr. Kirkpatrick\par \par 6. Diet, weight loss and cardiovascular exercise regimen have been stressed\par \par 7. Flu shot has been administered\par \par 8. Followup in one year with a wellness evaluation.

## 2021-11-16 ENCOUNTER — APPOINTMENT (OUTPATIENT)
Dept: CARDIOLOGY | Facility: CLINIC | Age: 61
End: 2021-11-16
Payer: COMMERCIAL

## 2021-11-16 PROCEDURE — 93880 EXTRACRANIAL BILAT STUDY: CPT

## 2021-11-20 ENCOUNTER — OUTPATIENT (OUTPATIENT)
Dept: OUTPATIENT SERVICES | Facility: HOSPITAL | Age: 61
LOS: 1 days | End: 2021-11-20
Payer: COMMERCIAL

## 2021-11-20 ENCOUNTER — APPOINTMENT (OUTPATIENT)
Age: 61
End: 2021-11-20
Payer: COMMERCIAL

## 2021-11-20 DIAGNOSIS — G47.33 OBSTRUCTIVE SLEEP APNEA (ADULT) (PEDIATRIC): ICD-10-CM

## 2021-11-20 PROCEDURE — G0400: CPT

## 2021-11-20 PROCEDURE — 95800 SLP STDY UNATTENDED: CPT

## 2021-11-24 ENCOUNTER — APPOINTMENT (OUTPATIENT)
Dept: CT IMAGING | Facility: CLINIC | Age: 61
End: 2021-11-24

## 2022-01-12 ENCOUNTER — TRANSCRIPTION ENCOUNTER (OUTPATIENT)
Age: 62
End: 2022-01-12

## 2022-03-31 ENCOUNTER — NON-APPOINTMENT (OUTPATIENT)
Age: 62
End: 2022-03-31

## 2022-06-10 ENCOUNTER — RX RENEWAL (OUTPATIENT)
Age: 62
End: 2022-06-10

## 2022-06-13 ENCOUNTER — NON-APPOINTMENT (OUTPATIENT)
Age: 62
End: 2022-06-13

## 2022-09-13 ENCOUNTER — APPOINTMENT (OUTPATIENT)
Dept: DERMATOLOGY | Facility: CLINIC | Age: 62
End: 2022-09-13

## 2022-11-29 ENCOUNTER — APPOINTMENT (OUTPATIENT)
Dept: DERMATOLOGY | Facility: CLINIC | Age: 62
End: 2022-11-29

## 2022-11-29 ENCOUNTER — NON-APPOINTMENT (OUTPATIENT)
Age: 62
End: 2022-11-29

## 2022-11-29 DIAGNOSIS — Z12.83 ENCOUNTER FOR SCREENING FOR MALIGNANT NEOPLASM OF SKIN: ICD-10-CM

## 2022-11-29 DIAGNOSIS — L81.4 OTHER MELANIN HYPERPIGMENTATION: ICD-10-CM

## 2022-11-29 DIAGNOSIS — D22.9 MELANOCYTIC NEVI, UNSPECIFIED: ICD-10-CM

## 2022-11-29 PROCEDURE — 99203 OFFICE O/P NEW LOW 30 MIN: CPT

## 2022-11-29 NOTE — HISTORY OF PRESENT ILLNESS
[FreeTextEntry1] : skin check [de-identified] : 62 year old male here for skin check. no tx tried.

## 2022-12-06 DIAGNOSIS — I25.84 ATHEROSCLEROTIC HEART DISEASE OF NATIVE CORONARY ARTERY W/OUT ANGINA PECTORIS: ICD-10-CM

## 2022-12-06 DIAGNOSIS — I25.10 ATHEROSCLEROTIC HEART DISEASE OF NATIVE CORONARY ARTERY W/OUT ANGINA PECTORIS: ICD-10-CM

## 2022-12-06 LAB
ALBUMIN SERPL ELPH-MCNC: 4.9 G/DL
ALP BLD-CCNC: 61 U/L
ALT SERPL-CCNC: 21 U/L
ANION GAP SERPL CALC-SCNC: 19 MMOL/L
AST SERPL-CCNC: 16 U/L
BILIRUB SERPL-MCNC: 0.6 MG/DL
BUN SERPL-MCNC: 15 MG/DL
CALCIUM SERPL-MCNC: 9.6 MG/DL
CHLORIDE SERPL-SCNC: 99 MMOL/L
CHOLEST SERPL-MCNC: 163 MG/DL
CO2 SERPL-SCNC: 22 MMOL/L
CREAT SERPL-MCNC: 1.02 MG/DL
EGFR: 84 ML/MIN/1.73M2
GLUCOSE SERPL-MCNC: 106 MG/DL
HDLC SERPL-MCNC: 41 MG/DL
LDLC SERPL CALC-MCNC: 107 MG/DL
NONHDLC SERPL-MCNC: 122 MG/DL
POTASSIUM SERPL-SCNC: 4.2 MMOL/L
PROT SERPL-MCNC: 6.7 G/DL
SODIUM SERPL-SCNC: 140 MMOL/L
TRIGL SERPL-MCNC: 74 MG/DL

## 2022-12-07 ENCOUNTER — TRANSCRIPTION ENCOUNTER (OUTPATIENT)
Age: 62
End: 2022-12-07

## 2023-04-16 ENCOUNTER — INPATIENT (INPATIENT)
Facility: HOSPITAL | Age: 63
LOS: 0 days | Discharge: HOME | DRG: 310 | End: 2023-04-17
Attending: HOSPITALIST | Admitting: FAMILY MEDICINE
Payer: COMMERCIAL

## 2023-04-16 VITALS — HEIGHT: 70 IN | WEIGHT: 199.96 LBS

## 2023-04-16 DIAGNOSIS — E78.5 HYPERLIPIDEMIA, UNSPECIFIED: ICD-10-CM

## 2023-04-16 DIAGNOSIS — I25.10 ATHEROSCLEROTIC HEART DISEASE OF NATIVE CORONARY ARTERY WITHOUT ANGINA PECTORIS: ICD-10-CM

## 2023-04-16 DIAGNOSIS — G47.33 OBSTRUCTIVE SLEEP APNEA (ADULT) (PEDIATRIC): ICD-10-CM

## 2023-04-16 DIAGNOSIS — R42 DIZZINESS AND GIDDINESS: ICD-10-CM

## 2023-04-16 DIAGNOSIS — R00.1 BRADYCARDIA, UNSPECIFIED: ICD-10-CM

## 2023-04-16 LAB
ALBUMIN SERPL ELPH-MCNC: 3.9 G/DL — SIGNIFICANT CHANGE UP (ref 3.3–5)
ALP SERPL-CCNC: 50 U/L — SIGNIFICANT CHANGE UP (ref 40–120)
ALT FLD-CCNC: 33 U/L — SIGNIFICANT CHANGE UP (ref 12–78)
ANION GAP SERPL CALC-SCNC: 1 MMOL/L — LOW (ref 5–17)
AST SERPL-CCNC: 9 U/L — LOW (ref 15–37)
BASOPHILS # BLD AUTO: 0.03 K/UL — SIGNIFICANT CHANGE UP (ref 0–0.2)
BASOPHILS NFR BLD AUTO: 0.4 % — SIGNIFICANT CHANGE UP (ref 0–2)
BILIRUB SERPL-MCNC: 0.9 MG/DL — SIGNIFICANT CHANGE UP (ref 0.2–1.2)
BUN SERPL-MCNC: 19 MG/DL — SIGNIFICANT CHANGE UP (ref 7–23)
CALCIUM SERPL-MCNC: 9.2 MG/DL — SIGNIFICANT CHANGE UP (ref 8.5–10.1)
CHLORIDE SERPL-SCNC: 106 MMOL/L — SIGNIFICANT CHANGE UP (ref 96–108)
CO2 SERPL-SCNC: 31 MMOL/L — SIGNIFICANT CHANGE UP (ref 22–31)
CREAT SERPL-MCNC: 0.95 MG/DL — SIGNIFICANT CHANGE UP (ref 0.5–1.3)
EGFR: 92 ML/MIN/1.73M2 — SIGNIFICANT CHANGE UP
EOSINOPHIL # BLD AUTO: 0.08 K/UL — SIGNIFICANT CHANGE UP (ref 0–0.5)
EOSINOPHIL NFR BLD AUTO: 1.1 % — SIGNIFICANT CHANGE UP (ref 0–6)
GLUCOSE SERPL-MCNC: 141 MG/DL — HIGH (ref 70–99)
HCT VFR BLD CALC: 45.8 % — SIGNIFICANT CHANGE UP (ref 39–50)
HGB BLD-MCNC: 15.4 G/DL — SIGNIFICANT CHANGE UP (ref 13–17)
IMM GRANULOCYTES NFR BLD AUTO: 0.7 % — SIGNIFICANT CHANGE UP (ref 0–0.9)
LYMPHOCYTES # BLD AUTO: 0.83 K/UL — LOW (ref 1–3.3)
LYMPHOCYTES # BLD AUTO: 11.3 % — LOW (ref 13–44)
MAGNESIUM SERPL-MCNC: 2.3 MG/DL — SIGNIFICANT CHANGE UP (ref 1.6–2.6)
MCHC RBC-ENTMCNC: 29.4 PG — SIGNIFICANT CHANGE UP (ref 27–34)
MCHC RBC-ENTMCNC: 33.6 GM/DL — SIGNIFICANT CHANGE UP (ref 32–36)
MCV RBC AUTO: 87.4 FL — SIGNIFICANT CHANGE UP (ref 80–100)
MONOCYTES # BLD AUTO: 0.46 K/UL — SIGNIFICANT CHANGE UP (ref 0–0.9)
MONOCYTES NFR BLD AUTO: 6.3 % — SIGNIFICANT CHANGE UP (ref 2–14)
NEUTROPHILS # BLD AUTO: 5.87 K/UL — SIGNIFICANT CHANGE UP (ref 1.8–7.4)
NEUTROPHILS NFR BLD AUTO: 80.2 % — HIGH (ref 43–77)
PLATELET # BLD AUTO: 272 K/UL — SIGNIFICANT CHANGE UP (ref 150–400)
POTASSIUM SERPL-MCNC: 4.1 MMOL/L — SIGNIFICANT CHANGE UP (ref 3.5–5.3)
POTASSIUM SERPL-SCNC: 4.1 MMOL/L — SIGNIFICANT CHANGE UP (ref 3.5–5.3)
PROT SERPL-MCNC: 7.2 GM/DL — SIGNIFICANT CHANGE UP (ref 6–8.3)
RAPID RVP RESULT: SIGNIFICANT CHANGE UP
RBC # BLD: 5.24 M/UL — SIGNIFICANT CHANGE UP (ref 4.2–5.8)
RBC # FLD: 12.6 % — SIGNIFICANT CHANGE UP (ref 10.3–14.5)
SARS-COV-2 RNA SPEC QL NAA+PROBE: SIGNIFICANT CHANGE UP
SODIUM SERPL-SCNC: 138 MMOL/L — SIGNIFICANT CHANGE UP (ref 135–145)
TROPONIN I, HIGH SENSITIVITY RESULT: 3.71 NG/L — SIGNIFICANT CHANGE UP
WBC # BLD: 7.32 K/UL — SIGNIFICANT CHANGE UP (ref 3.8–10.5)
WBC # FLD AUTO: 7.32 K/UL — SIGNIFICANT CHANGE UP (ref 3.8–10.5)

## 2023-04-16 PROCEDURE — 86803 HEPATITIS C AB TEST: CPT

## 2023-04-16 PROCEDURE — 99222 1ST HOSP IP/OBS MODERATE 55: CPT

## 2023-04-16 PROCEDURE — 93306 TTE W/DOPPLER COMPLETE: CPT

## 2023-04-16 PROCEDURE — 99285 EMERGENCY DEPT VISIT HI MDM: CPT

## 2023-04-16 PROCEDURE — 93017 CV STRESS TEST TRACING ONLY: CPT

## 2023-04-16 PROCEDURE — 71045 X-RAY EXAM CHEST 1 VIEW: CPT | Mod: 26

## 2023-04-16 PROCEDURE — 99223 1ST HOSP IP/OBS HIGH 75: CPT

## 2023-04-16 PROCEDURE — 84443 ASSAY THYROID STIM HORMONE: CPT

## 2023-04-16 PROCEDURE — 78452 HT MUSCLE IMAGE SPECT MULT: CPT

## 2023-04-16 PROCEDURE — A9500: CPT

## 2023-04-16 PROCEDURE — 36415 COLL VENOUS BLD VENIPUNCTURE: CPT

## 2023-04-16 PROCEDURE — 70450 CT HEAD/BRAIN W/O DYE: CPT | Mod: 26

## 2023-04-16 PROCEDURE — 70450 CT HEAD/BRAIN W/O DYE: CPT

## 2023-04-16 RX ORDER — SODIUM CHLORIDE 9 MG/ML
1000 INJECTION INTRAMUSCULAR; INTRAVENOUS; SUBCUTANEOUS ONCE
Refills: 0 | Status: COMPLETED | OUTPATIENT
Start: 2023-04-16 | End: 2023-04-16

## 2023-04-16 RX ORDER — ONDANSETRON 8 MG/1
4 TABLET, FILM COATED ORAL EVERY 8 HOURS
Refills: 0 | Status: ACTIVE | OUTPATIENT
Start: 2023-04-16 | End: 2024-03-14

## 2023-04-16 RX ORDER — LANOLIN ALCOHOL/MO/W.PET/CERES
3 CREAM (GRAM) TOPICAL AT BEDTIME
Refills: 0 | Status: ACTIVE | OUTPATIENT
Start: 2023-04-16 | End: 2024-03-14

## 2023-04-16 RX ORDER — ACETAMINOPHEN 500 MG
650 TABLET ORAL EVERY 6 HOURS
Refills: 0 | Status: ACTIVE | OUTPATIENT
Start: 2023-04-16 | End: 2024-03-14

## 2023-04-16 RX ADMIN — SODIUM CHLORIDE 1000 MILLILITER(S): 9 INJECTION INTRAMUSCULAR; INTRAVENOUS; SUBCUTANEOUS at 12:12

## 2023-04-16 NOTE — PATIENT PROFILE ADULT - FUNCTIONAL ASSESSMENT - DAILY ACTIVITY SCORE.
Complaining of mucus like discharge that occurred once on Tuesday but did have intercourse the night before.    Baby friendly education given for weeks 25-28  
Maybe breech  
24

## 2023-04-16 NOTE — ED ADULT NURSE NOTE - OBJECTIVE STATEMENT
pt presents to ED with complaints of dizziness and weakness. pt states he felt like he was floating accompanied with nausea.  pt denies vomiting. reports symptoms worsen when standing. pt found to be bradycardic in triage. 18 g placed to left AC. labs sent. EKG performed. cardiac and VS monitoring initiated.  no other complaints at this time. wife at bedside.

## 2023-04-16 NOTE — H&P ADULT - NSHPPHYSICALEXAM_GEN_ALL_CORE
ICU Vital Signs Last 24 Hrs  T(C): 36.8 (16 Apr 2023 14:55), Max: 36.8 (16 Apr 2023 14:55)  T(F): 98.2 (16 Apr 2023 14:55), Max: 98.2 (16 Apr 2023 14:55)  HR: 50 (16 Apr 2023 10:52) (50 - 50)  BP: 133/87 (16 Apr 2023 10:52) (133/87 - 133/87)  BP(mean): 101 (16 Apr 2023 10:52) (101 - 101)  ABP: --  ABP(mean): --  RR: 18 (16 Apr 2023 14:55) (16 - 18)  SpO2: 100% (16 Apr 2023 14:55) (99% - 100%)    O2 Parameters below as of 16 Apr 2023 14:55  Patient On (Oxygen Delivery Method): room air            PHYSICAL EXAM:    Constitutional: NAD, awake and alert  HEENT: PERR, EOMI, Normal Hearing, MMM  Neck: Soft and supple, No LAD, No JVD  Respiratory: Breath sounds are clear bilaterally, No wheezing, rales or rhonchi  Cardiovascular: S1 and S2, regular rate and rhythm, no Murmurs, gallops or rubs  Gastrointestinal: Bowel Sounds present, soft, nontender, nondistended, no guarding, no rebound  Extremities: No peripheral edema  Vascular: 2+ peripheral pulses  Neurological: A/O x 3, no focal deficits  Musculoskeletal: 5/5 strength b/l upper and lower extremities  Skin: No rashes

## 2023-04-16 NOTE — CONSULT NOTE ADULT - SUBJECTIVE AND OBJECTIVE BOX
PCP:    REQUESTING PHYSICIAN:    REASON FOR CONSULT:    CHIEF COMPLAINT:    HPI:  61 yo male w/PMHx of HLD not compliant to medication severe obstructive sleep apnea , not compliant to cpap , increase coronary calcium score 274 nonobstructive CAD  presents to the ED for weakness, lightheadedness that started this morning. as patient who woke up this morning  lightheaded feeling , gets worse standing ,with generalized weakness ,did not feel well , also looked pale by his wife at home , felt foggy whenever he is standing , checked his heard rate was in 40s ,  BP was elevated  patient was told by his nurse daughter , patient noted to have mild elevated BP , sinus bradycardia PACS ,   admitted for likely symptomatic bradycardia     Patient was diagnosed to have severe sleep apnea , did not like cpap , patient did feels tired few weeks ago for couple of days not normal for him self , patient also had coronary CT    30% LAD , 10% lesion in RCA LCX       PAST MEDICAL & SURGICAL HISTORY:  as above   S/P Tonsillectomy      S/P Hernia Surgery          Allergies    No Known Allergies    Intolerances        SOCIAL HISTORY:  former smoker  drinks daily       FAMILY HISTORY:  CAD siblings  father     MEDICATIONS:  MEDICATIONS  (STANDING):    MEDICATIONS  (PRN):  acetaminophen     Tablet .. 650 milliGRAM(s) Oral every 6 hours PRN Temp greater or equal to 38C (100.4F), Mild Pain (1 - 3)  aluminum hydroxide/magnesium hydroxide/simethicone Suspension 30 milliLiter(s) Oral every 4 hours PRN Dyspepsia  melatonin 3 milliGRAM(s) Oral at bedtime PRN Insomnia  ondansetron Injectable 4 milliGRAM(s) IV Push every 8 hours PRN Nausea and/or Vomiting      REVIEW OF SYSTEMS:    CONSTITUTIONAL: No weakness, fevers or chills  EYES/ENT: No visual changes;  No vertigo or throat pain   NECK: No pain or stiffness  RESPIRATORY: No cough, wheezing, hemoptysis; No shortness of breath  CARDIOVASCULAR: No chest pain or palpitations  GASTROINTESTINAL: No abdominal or epigastric pain. No nausea, vomiting, or hematemesis; No diarrhea or constipation. No melena or hematochezia.  GENITOURINARY: No dysuria, frequency or hematuria  NEUROLOGICAL: No numbness or weakness  SKIN: No itching, burning, rashes, or lesions   All other review of systems is negative unless indicated above    Vital Signs Last 24 Hrs  T(C): 36.8 (16 Apr 2023 14:55), Max: 36.8 (16 Apr 2023 14:55)  T(F): 98.2 (16 Apr 2023 14:55), Max: 98.2 (16 Apr 2023 14:55)  HR: 53 (16 Apr 2023 17:00) (50 - 54)  BP: 125/78 (16 Apr 2023 17:00) (125/78 - 143/95)  BP(mean): 101 (16 Apr 2023 10:52) (101 - 101)  RR: 18 (16 Apr 2023 17:00) (16 - 18)  SpO2: 99% (16 Apr 2023 17:00) (98% - 100%)    Parameters below as of 16 Apr 2023 14:55  Patient On (Oxygen Delivery Method): room air        I&O's Summary      PHYSICAL EXAM:    Constitutional: NAD, awake and alert, well-developed  HEENT: PERR, EOMI,  No oral cyananosis.  Neck:  supple,  No JVD  Respiratory: Breath sounds are clear bilaterally, No wheezing, rales or rhonchi  Cardiovascular: S1 and S2, regular rate and rhythm, no Murmurs, gallops or rubs  Gastrointestinal: Bowel Sounds present, soft, nontender.   Extremities: No peripheral edema. No clubbing or cyanosis.  Vascular: 2+ peripheral pulses  Neurological: A/O x 3, no focal deficits  Musculoskeletal: no calf tenderness.  Skin: No rashes.      LABS: All Labs Reviewed:                        15.4   7.32  )-----------( 272      ( 16 Apr 2023 10:45 )             45.8     16 Apr 2023 10:45    138    |  106    |  19     ----------------------------<  141    4.1     |  31     |  0.95     Ca    9.2        16 Apr 2023 10:45  Mg     2.3       16 Apr 2023 10:45    TPro  7.2    /  Alb  3.9    /  TBili  0.9    /  DBili  x      /  AST  9      /  ALT  33     /  AlkPhos  50     16 Apr 2023 10:45            Blood Culture:     - TroponinI hsT: <-3.71    RADIOLOGY/EKG:< from: 12 Lead ECG (04.16.23 @ 10:37) >  Sinus bradycardia with marked sinus arrhythmia  Otherwise normal ECG  No previous ECGs available  Confirmed by RICHARD BHATIA ([id number]) on 4/16/2023 12:24:18 PM    < end of copied text >

## 2023-04-16 NOTE — ED PROVIDER NOTE - OBJECTIVE STATEMENT
61 yo male w/PMHx of HLD presents to the ED for weakness, lightheadedness that started this morning. Pt states that he felt "like he was floating". Pt also endorses nausea, no vomiting. Pt reports he had a viral infection a couple of weeks ago. Denies being on HTN medications. Reports when he stands up the lightheadedness is worsened. Denies diarrhea or feeling dehydrated. No other complaints at this time.

## 2023-04-16 NOTE — ED ADULT TRIAGE NOTE - CHIEF COMPLAINT QUOTE
Pt presented to the ER with c/o lightheadedness, weakness and nausea. Pt stated that the symptoms started this morning. In triage his heartrate was in the 40s. Pt denies any chest pain, SOB, or N/V at this time.

## 2023-04-16 NOTE — ED PROVIDER NOTE - CLINICAL SUMMARY MEDICAL DECISION MAKING FREE TEXT BOX
61 yo with symptomatic bradycardiac, not on any meds. Will check electrolytes and discuss with his cardiologist.

## 2023-04-16 NOTE — ED PROVIDER NOTE - PHYSICAL EXAMINATION
Constitutional: middle aged male laying in bed, NAD AAOx3  Eyes: PERRLA EOMI  Head: Normocephalic atraumatic  Mouth: MMM  Cardiac: bradycardic    Resp: Lungs CTAB  GI: Abd s/nt/nd, no rebound or guarding.  Neuro: awake, alert, moving all extremities, cranial nerves 2-12 intact, sensation intact, no dysmetria.  Skin: No rashes

## 2023-04-16 NOTE — PATIENT PROFILE ADULT - FUNCTIONAL ASSESSMENT - DAILY ACTIVITY 5.
Rapid Response Note    Reason for rapid response / chief complaint: Vtach, chest pain    History Of Present Illness  Paulie is a 83 year old male with a history of CABG 2003, PCI 2018, hypothyroidism, who presented with chest pains and SOB.  Last night patient had symptomatic vtach (witnessed pre-syncopal episode) that improved with IV metoprolol.    This morning at 7:10am, RRT called for long runs of NSVT along with patient complaining of LH, significant chest squeezing sensation, and shortness of breath.  Cardiac monitor showed long frequent runs (up to 20 seconds at times) of MMVT.  EKG showed NSVT with MMVT (appears right sided superiorly directed).  Patient with periods of symptomatic hypotension to 80s systolic, currently more stable on medications listed below.    Patient given a total of amiodarone 150mg x2 bolus, 100mg lidocaine bolus, metoprolol 5mg x2 IVP, 1LNS.  Currently on amiodarone infusion 1mg/min, lidocaine infusion 1mg/min, phenylephrine 80 mcg/min infusion.        Past Medical History  Past Medical History:   Diagnosis Date   • Coronary artery disease    • Essential (primary) hypertension    • High cholesterol    • Hx of CABG    • S/P coronary artery stent placement    • Thyroid condition         Surgical History  Past Surgical History:   Procedure Laterality Date   • Appendectomy     • Cataract extraction, bilateral     • Colonoscopy     • Coronary artery bypass graft     • Knee surgery     • Shoulder surgery     • Transurethral elec-surg prostatectom          Social History  Social History     Tobacco Use   • Smoking status: Former Smoker     Types: Cigarettes   • Smokeless tobacco: Never Used   • Tobacco comment: currently smokes cigars   Substance Use Topics   • Alcohol use: Yes     Comment: socially    • Drug use: No       Family History  Family History   Problem Relation Age of Onset   • Heart disease Mother    • Heart disease Son         Allergies  ALLERGIES:  Contrast media, Grass, Iodine    (environmental or med), and No name available    Home Medications  Medications Prior to Admission   Medication Sig Dispense Refill   • losartan (COZAAR) 50 MG tablet Take 50 mg by mouth daily.     • hydrochlorothiazide (HYDRODIURIL) 12.5 MG tablet Take 12.5 mg by mouth daily.     • aspirin (ECOTRIN) 81 MG EC tablet Take 81 mg by mouth daily.     • venlafaxine XR (EFFEXOR XR) 75 MG 24 hr capsule Take 75 mg by mouth daily.  180 capsule 0   • metoPROLOL succinate (TOPROL-XL) 50 MG 24 hr tablet Take 0.5 tablets by mouth daily. 45 tablet 1   • clopidogrel (PLAVIX) 75 MG tablet Take 75 mg by mouth daily.     • amLODIPine (NORVASC) 5 MG tablet Take 1 tablet by mouth nightly. TAKE 1 TABLET EVERY NIGHT AT BEDTIME 90 tablet 1   • atorvastatin (LIPITOR) 40 MG tablet Take 1 tablet by mouth nightly. TAKE 1 TABLET BY MOUTH DAILY AT BEDTIME 90 tablet 1   • levothyroxine (SYNTHROID, LEVOTHROID) 25 MCG tablet Take 1 tablet by mouth daily. TAKE 1 TABLET BY MOUTH DAILY 90 tablet 1   • QUEtiapine (SEROQUEL) 400 MG tablet Take 1 tablet by mouth daily.         Inpatient Medications  • metoPROLOL       • metoPROLOL  5 mg Intravenous 4 times per day   • lidocaine  100 mg Intravenous Once   • isosorbide mononitrate  30 mg Oral Daily   • hydrALAZINE  50 mg Oral 3 times per day   • sodium chloride (PF)  2 mL Intracatheter 2 times per day   • heparin (porcine)  5,000 Units Subcutaneous 3 times per day   • Potassium Standard Replacement Protocol   Does not apply See Admin Instructions   • Magnesium Standard Replacement Protocol   Does not apply See Admin Instructions   • Phosphorus Standard Replacement Protocol   Does not apply See Admin Instructions   • amLODIPine  5 mg Oral Nightly   • aspirin  81 mg Oral Daily   • atorvastatin  40 mg Oral Nightly   • clopidogrel  75 mg Oral Daily   • levothyroxine  25 mcg Oral Daily   • QUEtiapine  400 mg Oral Daily   • venlafaxine XR  75 mg Oral Daily   • hydrochlorothiazide  12.5 mg Oral Daily      •  AMIODarone (CORDARONE) 450 mg/250 mL dextrose 5% infusion 1 mg/min (11/15/20 0817)   • lidocaine (XYLOCAINE) 2,000 mg/500 mL in dextrose 5 % infusion     • lidocaine (XYLOCAINE) 2,000 mg/500 mL in dextrose 5 % infusion        acetaminophen, sodium chloride, sodium chloride     ROS:  A 12 system review is negative in detail other than per HPI      Last Recorded Vitals  Blood pressure 103/53, pulse (!) 231, temperature 97.7 °F (36.5 °C), temperature source Oral, resp. rate 18, height 5' 11\" (1.803 m), weight 97.6 kg (215 lb 2.7 oz), SpO2 96 %.    Body mass index is 30.01 kg/m².    Weight    11/13/20 1858 11/14/20 0400 11/15/20 0500   Weight: 98.3 kg (216 lb 11.4 oz) 98.1 kg (216 lb 4.3 oz) 97.6 kg (215 lb 2.7 oz)        I/O last 3 completed shifts:  In: 850.9 [P.O.:600; IV Piggyback:250.9]  Out: 1050 [Urine:1050]    General: A&Ox3, intermittently appears uncomfortable and moaning with pain.  Eyes: EOMI  HENT: Normocephalic, atraumatic.  Respiratory: CTAB, no crackles or wheezes  Cardiovascular: irregular, slow, systolic murmur   GI: Soft, non-tender, non-distended, normoactive bowel sounds.  MSK: Normal ROM, No significant lower extremity edema.       Labs   Recent Labs   Lab 11/15/20  0730 11/15/20  0030 11/14/20  1110 11/14/20  0636 11/12/20  2225 11/12/20  1619   WBC 8.7 8.5  --  8.9  --  6.3   HGB 14.4 14.7  --  16.1  --  14.6   HCT 42.6 44.1  --  47.4  --  44.2   MCV 93.4 92.1  --  91.9  --  93.1   MCH 31.6 30.7  --  31.2  --  30.7   MCHC 33.8 33.3  --  34.0  --  33.0    231  --  266  --  248   SODIUM 137 139 137  --   --  139   POTASSIUM 5.0 3.9 5.1  --   --  4.1   CHLORIDE 109* 108* 105  --   --  106   CO2 20* 24 28  --   --  25   BUN 34* 31* 21*  --   --  19   CREATININE 1.45* 1.65* 0.92  --   --  1.03   GLUCOSE 110* 133* 116*  --   --  101*   CALCIUM 8.7 9.1 9.3  --   --  9.1   AST 27 15  --   --   --  18   GPT 20 21  --   --   --  25   RAPDTR <0.02 <0.02  --   --  <0.02 <0.02   NTPROB 2,562*  --   --    --   --  211     Recent Labs     11/12/20  1619 11/12/20  2225 11/15/20  0030 11/15/20  0730   RAPDTR <0.02 <0.02 <0.02 <0.02   PT 10.7  --   --   --    INR 1.0  --   --   --      Imaging  Xr Chest Pa And Lateral 2 Views    Result Date: 11/12/2020  HISTORY:Chest Pain.  Chest pain and sob PA and Lateral view of the chest. comparison: November 14, 2018 Cardiac and mediastinal silhouette is normal.  There is interval development of ill-defined subtle opacity in the right midlung likely in the base of the right upper lobe adjacent to the minor fissure and ill-defined density in the left lower lobe. Correlate with clinical exam for early infiltrates. This is also evident in the retrocardiac region of the lung base on lateral view likely corresponding to the left lower lobe. There is no  pleural effusion. No pneumothorax is seen. Pulmonary vascularity is normal. There are no pulmonary nodules or masses.  Extensive degenerative changes are demonstrated in the spinal axis, and shoulders bilaterally. Remainder of the exam is otherwise unremarkable.     Ill-defined densities in both lungs as described, correlate with clinical exam.  Electronically Signed by: ROSA LYNCH MD Signed on: 11/12/2020 5:22 PM     Cta Chest Pulmonary Embolism W Contrast    Result Date: 11/13/2020  PROCEDURE:CTA CHEST PULMONARY EMBOLISM W CONTRAST HISTORY:  Shortness of breath.  Chest pain.  Dizziness. TECHNIQUE: CT angiogram of the chest was performed with IV contrast as per pulmonary embolus protocol.   3-D Maximum Intensity Projections were performed on an independent workstation with concurrent supervision of a radiologist. Multiplanar reformats were performed and evaluated.  COMPARISON:  None. FINDINGS: Support Devices:  None. Pulmonary Vasculature: This is a diagnostic quality PE study.  There is no acute pulmonary embolism to the 1st subsegmental pulmonary artery level.  The main pulmonary artery is normal in diameter.  Heart/Pericardium/Aorta:       Cardiac size is normal.      There is calcific atherosclerosis of the native coronary arteries.  There has been prior coronary artery bypass graft surgery (CABG).      There is no pericardial effusion. There is mild aortic annular calcification.  There is mild calcific aortic and branch vessel atherosclerosis.     Pleural Spaces:  The pleural spaces are clear. Mediastinum/Ana María:  There is no mediastinal or hilar lymph node enlargement. Neck Base/Chest Wall/Upper Abdomen:  There is no supraclavicular or axillary lymph node enlargement.  Visualized portions of the upper abdomen are within normal limits.  The patient is status post median sternotomy.  Advanced multilevel degenerative change is present in the spine. Lungs/Central Airways:  The trachea and central bronchi are clear.  There is a background of mild centrilobular emphysema.  A few right lower lobe pulmonary micronodules are noted (4/66, 4/68).  There is left basilar atelectasis.  A 15 mm nodular opacity  in the left lower lobe may represent a true pulmonary nodule or may be part of the process of the left lower lobe atelectasis (4/86).     1.   No acute pulmonary embolism to the first subsegmental pulmonary artery level. 2.   Mild centrilobular emphysema. 3.   Left lower lobe subsegmental atelectasis.  A 15 mm nodular opacity may represent a true pulmonary nodule versus adjacent consolidation.  Follow-up chest CT is recommended in three months. Electronically Signed by: PRAMOD FOREMAN M.D. Signed on: 11/13/2020 6:27 PM      Assessment & Plan     # NSVT / monomorphic VT  # Hypotension  # CAD s/p CABG s/p PCI    - No clear signs of MI; troponin negative.    - Plan for coronary angiogram tomorrow at latest; cardiology aware of instability  - BNP elevated but not showing signs of volume overload (also CHARITO may be artificially elevating)  - Continue medications as given above    * Signout given to MICU service       Code Status     Code Status: Full Resuscitation    - Pao Perera MD  House physician    I spent greater than 130 minutes coordinating care, reviewing patient's diagnostic studies, examining patient, reviewing pertinent notes, collaborating with RNs/physicians/APNs/PAs involved in patient's care, determining management plan, and discussing plan with patient at bedside.     4 = No assist / stand by assistance

## 2023-04-16 NOTE — ED PROVIDER NOTE - NS ED ROS FT
Constitutional: No fever or chills, +generalized weakness, +lightheadedness   Eyes: No visual changes  HEENT: No throat pain  CV: No chest pain  Resp: No SOB no cough  GI: No abd pain, nausea or vomiting  : No dysuria  MSK: No musculoskeletal pain  Skin: No rash  Neuro: No headache

## 2023-04-16 NOTE — CONSULT NOTE ADULT - PROBLEM SELECTOR RECOMMENDATION 9
? symptomatic bradycardia ,   rule out MI , continue to monitor ,? elevated blood pressure ,  exercise nuclear stress test as patient nonobstructive CAD  on coronary CT in 2001  , echo , encourage patient to follow low salt diet , continue statin,  bp monitor , if remain elevated will add losartan 25 mg po daily

## 2023-04-16 NOTE — H&P ADULT - NSHPLABSRESULTS_GEN_ALL_CORE
CBC:            15.4   7.32  )-----------( 272      ( 04-16-23 @ 10:45 )             45.8         Chem:         ( 04-16-23 @ 10:45 )    138  |  106  |  19  ----------------------------<  141<H>  4.1   |  31  |  0.95        Liver Functions: ( 04-16-23 @ 10:45 )  Alb: 3.9 g/dL / Pro: 7.2 gm/dL / ALK PHOS: 50 U/L / ALT: 33 U/L / AST: 9 U/L / GGT: x              Type & Screen:     < from: Xray Chest 1 View- PORTABLE-Urgent (04.16.23 @ 12:38) >      IMPRESSION: No acute finding or change.    --- End of Report -    < end of copied text >

## 2023-04-16 NOTE — H&P ADULT - ASSESSMENT
63 yo male w/PMHx of HLD presents to the ED for weakness, lightheadedness that started this morning. Wife at bedside and states he has not followed up with cardiology in more than two years. Pt is a former smoker and does not exercise regularly. Today suddenly felt dizzins upon standing and walking. Daughter is a nurse and states his HR was in the 40s. On arrival pt was also found to be bradycardic from 40-50bpmd. He is not on any regular meds. No h/o thyroid disease.           #Symptomatic bradycardia  - admit to tele  - r/o metabolic/thyroidal disease  - 2D echo  - pacer pads in place  - HR presently 50s pt wihtout symptoms  - Cardiology consult for Holter  - check TSH  - if ongoing bradycardia -> obtain EP consult  - Dr Palla rec monitor for 24 hours

## 2023-04-16 NOTE — CONSULT NOTE ADULT - PROBLEM SELECTOR RECOMMENDATION 2
increase cardiac calcium score , multiple vessel mild nonobstructive calcified non calcified coronary disease , LAD 30%    encourage patient to be compliant to atorvastatin ,

## 2023-04-17 ENCOUNTER — TRANSCRIPTION ENCOUNTER (OUTPATIENT)
Age: 63
End: 2023-04-17

## 2023-04-17 VITALS
OXYGEN SATURATION: 95 % | HEART RATE: 63 BPM | SYSTOLIC BLOOD PRESSURE: 127 MMHG | RESPIRATION RATE: 17 BRPM | DIASTOLIC BLOOD PRESSURE: 83 MMHG

## 2023-04-17 LAB
25(OH)D3 SERPL-MCNC: 24.5 NG/ML
ALBUMIN SERPL ELPH-MCNC: 4.8 G/DL
ALP BLD-CCNC: 55 U/L
ALT SERPL-CCNC: 20 U/L
ANION GAP SERPL CALC-SCNC: 12 MMOL/L
APPEARANCE: CLEAR
AST SERPL-CCNC: 14 U/L
BACTERIA: NEGATIVE
BASOPHILS # BLD AUTO: 0.04 K/UL
BASOPHILS NFR BLD AUTO: 0.6 %
BILIRUB SERPL-MCNC: 0.8 MG/DL
BILIRUBIN URINE: NEGATIVE
BLOOD URINE: ABNORMAL
BUN SERPL-MCNC: 21 MG/DL
CALCIUM SERPL-MCNC: 10 MG/DL
CHLORIDE SERPL-SCNC: 101 MMOL/L
CHOLEST SERPL-MCNC: 177 MG/DL
CO2 SERPL-SCNC: 28 MMOL/L
COLOR: NORMAL
CREAT SERPL-MCNC: 0.99 MG/DL
EGFR: 87 ML/MIN/1.73M2
EOSINOPHIL # BLD AUTO: 0.16 K/UL
EOSINOPHIL NFR BLD AUTO: 2.4 %
GLUCOSE QUALITATIVE U: NEGATIVE
GLUCOSE SERPL-MCNC: 103 MG/DL
HCT VFR BLD CALC: 47.5 %
HCV AB S/CO SERPL IA: 0.13 S/CO — SIGNIFICANT CHANGE UP (ref 0–0.99)
HCV AB SERPL-IMP: SIGNIFICANT CHANGE UP
HDLC SERPL-MCNC: 45 MG/DL
HGB BLD-MCNC: 15.1 G/DL
HYALINE CASTS: 0 /LPF
IMM GRANULOCYTES NFR BLD AUTO: 0.3 %
KETONES URINE: NEGATIVE
LDLC SERPL CALC-MCNC: 117 MG/DL
LEUKOCYTE ESTERASE URINE: NEGATIVE
LYMPHOCYTES # BLD AUTO: 1.61 K/UL
LYMPHOCYTES NFR BLD AUTO: 23.9 %
MAN DIFF?: NORMAL
MCHC RBC-ENTMCNC: 29.2 PG
MCHC RBC-ENTMCNC: 31.8 GM/DL
MCV RBC AUTO: 91.7 FL
MICROSCOPIC-UA: NORMAL
MONOCYTES # BLD AUTO: 0.67 K/UL
MONOCYTES NFR BLD AUTO: 9.9 %
NEUTROPHILS # BLD AUTO: 4.25 K/UL
NEUTROPHILS NFR BLD AUTO: 62.9 %
NITRITE URINE: NEGATIVE
NONHDLC SERPL-MCNC: 132 MG/DL
PH URINE: 6
PLATELET # BLD AUTO: 295 K/UL
POTASSIUM SERPL-SCNC: 4.2 MMOL/L
PROT SERPL-MCNC: 6.8 G/DL
PROTEIN URINE: NEGATIVE
PSA SERPL-MCNC: 0.28 NG/ML
RBC # BLD: 5.18 M/UL
RBC # FLD: 13.2 %
RED BLOOD CELLS URINE: 0 /HPF
SODIUM SERPL-SCNC: 141 MMOL/L
SPECIFIC GRAVITY URINE: 1.02
SQUAMOUS EPITHELIAL CELLS: 0 /HPF
T3FREE SERPL-MCNC: 3.05 PG/ML
T4 FREE SERPL-MCNC: 1.1 NG/DL
TRIGL SERPL-MCNC: 75 MG/DL
TSH SERPL-ACNC: 1.56 UIU/ML
TSH SERPL-MCNC: 1.15 UU/ML — SIGNIFICANT CHANGE UP (ref 0.34–4.82)
UROBILINOGEN URINE: NORMAL
WBC # FLD AUTO: 6.75 K/UL
WHITE BLOOD CELLS URINE: 0 /HPF

## 2023-04-17 PROCEDURE — 93306 TTE W/DOPPLER COMPLETE: CPT | Mod: 26

## 2023-04-17 PROCEDURE — 99239 HOSP IP/OBS DSCHRG MGMT >30: CPT

## 2023-04-17 PROCEDURE — 93018 CV STRESS TEST I&R ONLY: CPT

## 2023-04-17 PROCEDURE — 93016 CV STRESS TEST SUPVJ ONLY: CPT

## 2023-04-17 PROCEDURE — 78452 HT MUSCLE IMAGE SPECT MULT: CPT | Mod: 26

## 2023-04-17 PROCEDURE — 99233 SBSQ HOSP IP/OBS HIGH 50: CPT | Mod: 25

## 2023-04-17 RX ORDER — ASPIRIN/CALCIUM CARB/MAGNESIUM 324 MG
1 TABLET ORAL
Qty: 30 | Refills: 0
Start: 2023-04-17

## 2023-04-17 RX ORDER — ATORVASTATIN CALCIUM 80 MG/1
1 TABLET, FILM COATED ORAL
Qty: 30 | Refills: 0
Start: 2023-04-17

## 2023-04-17 RX ADMIN — Medication 650 MILLIGRAM(S): at 13:30

## 2023-04-17 NOTE — DISCHARGE NOTE PROVIDER - NSDCCAREPROVSEEN_GEN_ALL_CORE_FT
Paddy Mason (Hospital Medicine)  Mitesh Seymour (Cardiac Electrophysiology)  Reese Wilson (Cardiology)  Palla, Venugopal R (Cardiology)  Usama Moreira (Hospital Medicine)  Kaye Simental (Cardiac Electrophysiology)

## 2023-04-17 NOTE — PROGRESS NOTE ADULT - PROBLEM SELECTOR PLAN 4
·  Problem: Severe obstructive sleep apnea.   ·  Recommendation: was not compliant to cpap - will see Dr. Hughes, advised pt. to sleep with HOB @ 30 DG

## 2023-04-17 NOTE — PROGRESS NOTE ADULT - PROBLEM SELECTOR PLAN 1
pt. with episodes of lightheadedness at home likely due to severe NURIA, unlikle cardiac cause as cardiac workup is normal -  Echo with preserved EF 50-57%, nuclear stress test - normal with EF 57%,   Recommendation: CPAP, follow up with pulmonology     pt. is hemodynamically stable, no cardiac complaints, will sign off pt. with episodes of lightheadedness at home likely due to severe NURIA, unlikle cardiac cause as cardiac workup is normal -  Echo with preserved EF 50-57%, nuclear stress test - normal with EF 57%, Tele with nocturnal bradycardia 40's no pauses recorded overnight, EP recs noted  Recommendation: CPAP, follow up with pulmonology     pt. is hemodynamically stable, no cardiac complaints, will sign off

## 2023-04-17 NOTE — PROGRESS NOTE ADULT - SUBJECTIVE AND OBJECTIVE BOX
PCP:    REQUESTING PHYSICIAN:    REASON FOR CONSULT:    CHIEF COMPLAINT:    HPI:  61 yo male w/PMHx of HLD not compliant to medication severe obstructive sleep apnea , not compliant to cpap , increase coronary calcium score 274 nonobstructive CAD  presents to the ED for weakness, lightheadedness that started this morning. as patient who woke up this morning  lightheaded feeling , gets worse standing ,with generalized weakness ,did not feel well , also looked pale by his wife at home , felt foggy whenever he is standing , checked his heard rate was in 40s ,  BP was elevated  patient was told by his nurse daughter , patient noted to have mild elevated BP , sinus bradycardia PACS ,   admitted for likely symptomatic bradycardia     Patient was diagnosed to have severe sleep apnea , did not like cpap , patient did feels tired few weeks ago for couple of days not normal for him self , patient also had coronary CT    30% LAD , 10% lesion in RCA LCX         4/17/23: no cardiac complaints, normal nuclear stress test, Tele: Adi 40 while asleep, no pauses - NSR 60s awake     MEDICATIONS  (STANDING):    MEDICATIONS  (PRN):  acetaminophen     Tablet .. 650 milliGRAM(s) Oral every 6 hours PRN Temp greater or equal to 38C (100.4F), Mild Pain (1 - 3)  aluminum hydroxide/magnesium hydroxide/simethicone Suspension 30 milliLiter(s) Oral every 4 hours PRN Dyspepsia  melatonin 3 milliGRAM(s) Oral at bedtime PRN Insomnia  ondansetron Injectable 4 milliGRAM(s) IV Push every 8 hours PRN Nausea and/or Vomiting    Vital Signs Last 24 Hrs  T(C): 36.7 (17 Apr 2023 07:09), Max: 36.7 (16 Apr 2023 19:21)  T(F): 98.1 (17 Apr 2023 07:09), Max: 98.1 (17 Apr 2023 07:09)  HR: 49 (17 Apr 2023 07:09) (49 - 54)  BP: 134/79 (17 Apr 2023 07:09) (125/78 - 154/91)  BP(mean): --  RR: 18 (17 Apr 2023 07:09) (16 - 18)  SpO2: 94% (17 Apr 2023 07:09) (94% - 99%)    Parameters below as of 17 Apr 2023 07:09  Patient On (Oxygen Delivery Method): room air          PHYSICAL EXAM:    Constitutional: NAD, awake and alert, well-developed  HEENT: PERR, EOMI,  No oral cyananosis.  Neck:  supple,  No JVD  Respiratory: Breath sounds are clear bilaterally, No wheezing, rales or rhonchi  Cardiovascular: S1 and S2, regular rate and rhythm, no Murmurs, gallops or rubs  Gastrointestinal: Bowel Sounds present, soft, nontender.   Extremities: No peripheral edema. No clubbing or cyanosis.  Vascular: 2+ peripheral pulses  Neurological: A/O x 3, no focal deficits  Musculoskeletal: no calf tenderness.  Skin: No rashes.      LABS: All Labs Reviewed:                         15.4   7.32  )-----------( 272      ( 16 Apr 2023 10:45 )             45.8     04-16    138  |  106  |  19  ----------------------------<  141<H>  4.1   |  31  |  0.95    Ca    9.2      16 Apr 2023 10:45  Mg     2.3     04-16    TPro  7.2  /  Alb  3.9  /  TBili  0.9  /  DBili  x   /  AST  9<L>  /  ALT  33  /  AlkPhos  50  04-16    - TroponinI hsT: <-3.71                        15.4   7.32  )-----------( 272      ( 16 Apr 2023 10:45 )             45.8     16 Apr 2023 10:45    138    |  106    |  19     ----------------------------<  141    4.1     |  31     |  0.95     Ca    9.2        16 Apr 2023 10:45  Mg     2.3       16 Apr 2023 10:45    TPro  7.2    /  Alb  3.9    /  TBili  0.9    /  DBili  x      /  AST  9      /  ALT  33     /  AlkPhos  50     16 Apr 2023 10:45    Blood Culture:     - TroponinI hsT: <-3.71    RADIOLOGY/EKG: reviewed    < from: TTE Echo Complete w/o Contrast w/ Doppler (04.17.23 @ 07:50) >   Impression     Summary     The left ventricle is normal in size, wall thickness, wall motion and   contractility.   Estimated left ventricular ejection fraction is 50-55 %.   Normal appearing left atrium.   The aortic valve is trileaflet with thin pliable leaflets.   Trivial aortic regurgitation is present.   The mitral valve leaflets appear thickened.   Trace mitral regurgitation is present.   The tricuspid valve leaflets are thin and pliable; valve motion is   normal.   Trace tricuspid valve regurgitation is present.     Signature     ----------------------------------------------------------------   Electronically signed by Luis Angel Hawkins MD(Interpreting    < end of copied text >  < from: NM Nuclear Stress Multiple (04.17.23 @ 10:45) >  IMPRESSION: Normal SPECT Myocardial Perfusion Imaging.    No scan evidence of reversible or fixed perfusion defects.    Normal left ventricular contractility with an ejection fraction of 57%   (Normal: 50% or greater).    No regional wall motion abnormalities.    Please refer to cardiac stress test report for maximum heart rate   achieved, EKG findings and symptoms during the procedure.    < end of copied text >      < from: 12 Lead ECG (04.16.23 @ 10:37) >  Sinus bradycardia with marked sinus arrhythmia  Otherwise normal ECG  No previous ECGs available  Confirmed by RICHARD BHATIA ([id number]) on 4/16/2023 12:24:18 PM    < end of copied text >      
Exercise Nuclear Stress test performed without complication.   no evidence of chronotropic incompetence  frequent APC during recovery  no ischemic changes.   Nuclear imaging report to follow

## 2023-04-17 NOTE — CONSULT NOTE ADULT - ASSESSMENT
61 yo M with above PMHx presented with episode of ' lightheadedness' found to have HR 40's, denies prior syncope  - tele reviewed : nocturnal bradycardia 40's no pauses recorded overnight, Pt is not compliant with CPAP at all.  Advised pt to f/u with  pulmonary for adjustment of CPAP  -Pt had exercise nuclear stress test today; no evidence of chronotrophic incompetence as per cardiologist, full report-pending  - unexplained episode of lightheadedness at home, not likely bradycardia related  -No further EP work-up recommended, if symptoms recurs, may consider MCOT by cardiologist  Plan d/w pt//hospitalist.   63 yo M with above PMHx presented with episode of ' lightheadedness' found to have HR 40's, denies prior syncope  - tele reviewed : nocturnal bradycardia 40's no pauses recorded overnight, Pt is not compliant with CPAP at all.  Advised pt to f/u with  pulmonary for adjustment of CPAP  -Pt had exercise nuclear stress test today; no evidence of chronotrophic incompetency as per cardiologist, full report-pending  - unexplained episode of lightheadedness at home, not likely bradycardia related  -No further EP work-up recommended, if symptoms recurs, may consider MCOT by cardiologist  Plan d/w pt//hospitalist.

## 2023-04-17 NOTE — DISCHARGE NOTE NURSING/CASE MANAGEMENT/SOCIAL WORK - NSDCPEFALRISK_GEN_ALL_CORE
For information on Fall & Injury Prevention, visit: [email or web address] OR  [email or web address] OR  https://www.cdc.gov/steadi/patient.html

## 2023-04-17 NOTE — PROGRESS NOTE ADULT - PROBLEM SELECTOR PLAN 3
·  Problem: Hyperlipidemia.   ·  Recommendation: uncontrolled as he ran out of the medication , lipid profile   resume atorvastatin 40 mg po daily.

## 2023-04-17 NOTE — DISCHARGE NOTE PROVIDER - NSDCCPCAREPLAN_GEN_ALL_CORE_FT
PRINCIPAL DISCHARGE DIAGNOSIS  Diagnosis: Sinus bradycardia  Assessment and Plan of Treatment: You were treated in the hospital for lightheadedness and "fogginess" upon waking, found to have sinus bradycardia, in setting of known non-occlusive coronary artery disease, dyslipidemia and obstructive sleep apnea. Reassuringly, your symptoms have since improved. On cardiac monitoring, you were in a regular sinus rhythm, with heart rates 40s-60s, typically higher in that range when awake and lower in that range when asleep. No pauses. You had stress testing done today. During this test you had a normal chronotropic response (appropriate increase in heart rate with exercise), which was a good finding regarding the bradycardia. Nuclear imaging was negative for ischemia as there was no scan evidence of reversible or fixed perfusion defects (no findings to suggest a coronary artery blockage). Normal left ventricular contractility with an ejection fraction of 57% (normal heart squeezing function). No regional wall motion abnormalities (no sign of heart muscle damage). Echocardiogram done as well. Normal in size, wall thickness, wall motion and contractility. No significant heart valve findings. You were seen by Cardiology and Cardiac Electrophysiology and cleared for discharge home. Advised adherence to CPAP for your sleep apnea. Outpatient follow up with Primary Care, Pulmonary Medicine Dr Hughes, Cardiology Dr Wilson or one of his colleagues (Dr. Palla Dr. Boglioli Dr. Moulinie). May benefit from outpatient MCOT (cardiac event monitor). Dr Wilson or one of his Cardiology colleagues you end up seeing in the office will arrange for you to receive the monitoring device. Please restart atorvastatin 40mg po daily at bedtime. Aspirin 81mg daily as well. Both prescribed to your pharmacy.

## 2023-04-17 NOTE — DISCHARGE NOTE PROVIDER - NSDCMRMEDTOKEN_GEN_ALL_CORE_FT
Aspirin Enteric Coated 81 mg oral delayed release tablet: 1 tab(s) orally once a day  atorvastatin 40 mg oral tablet: 1 tab(s) orally once a day (at bedtime)

## 2023-04-17 NOTE — DISCHARGE NOTE NURSING/CASE MANAGEMENT/SOCIAL WORK - PATIENT PORTAL LINK FT
You can access the FollowMyHealth Patient Portal offered by [facility] by registering at the following website: [email or web address]. By joining [facility]’s FollowMyHealth portal, you will also be able to view your health information using other applications (apps) compatible with our system.

## 2023-04-17 NOTE — CONSULT NOTE ADULT - SUBJECTIVE AND OBJECTIVE BOX
HPI:  63 yo male w/PMHx of HLD presents to the ED for weakness, lightheadedness that started this morning. Wife at bedside and states he has not followed up with cardiology in more than two years. Pt is a former smoker and does not exercise regularly. Today suddenly felt dizzins upon standing and walking. Daughter is a nurse and states his HR was in the 40s. On arrival pt was also found to be bradycardic from 40-50bpmd. He is not on any regular meds. No h/o thyroid disease.         Social: drinks a few beers daily, no drug use. Former smoker  Fhx: parents/siblings have CAD    PAST MEDICAL/SURGICAL/FAMILY/SOCIAL HISTORY:    Past Medical, Past Surgical, and Family History:  PAST SURGICAL HISTORY:  S/P Hernia Surgery     S/P Tonsillectomy.    ALLERGIES AND HOME MEDICATIONS:   Allergies:        Allergies:  	No Known Allergies:    (16 Apr 2023 15:29)      PAST MEDICAL & SURGICAL HISTORY:  S/P Tonsillectomy      S/P Hernia Surgery          FAMILY HISTORY:      SOCIAL HISTORY:  Allergies    No Known Allergies    Intolerances      MEDICATIONS  (STANDING):    MEDICATIONS  (PRN):  acetaminophen     Tablet .. 650 milliGRAM(s) Oral every 6 hours PRN Temp greater or equal to 38C (100.4F), Mild Pain (1 - 3)  aluminum hydroxide/magnesium hydroxide/simethicone Suspension 30 milliLiter(s) Oral every 4 hours PRN Dyspepsia  melatonin 3 milliGRAM(s) Oral at bedtime PRN Insomnia  ondansetron Injectable 4 milliGRAM(s) IV Push every 8 hours PRN Nausea and/or Vomiting    ROS:  General: Pt denies recent weight loss/fever/chills    Neurological: denies numbness or  sensation loss    Cardiovascular: denies chest pain/palpitations/leg edema    Respiratory and Thorax: denies SOB/cough/wheezing    Gastrointestinal: denies abdominal pain/diarrhea/constipation/bloody stool    Genitourinary: denies urinary frequency/urgency/ dysuria    Musculoskeletal: denies joint pain or swelling, denies restricted motion    Hematologic: denies abnormal bleeding  	    	  	    		        	    	          Physical Exam:  Vital Signs Last 24 Hrs  T(C): 36.7 (17 Apr 2023 07:09), Max: 36.8 (16 Apr 2023 14:55)  T(F): 98.1 (17 Apr 2023 07:09), Max: 98.2 (16 Apr 2023 14:55)  HR: 49 (17 Apr 2023 07:09) (49 - 54)  BP: 134/79 (17 Apr 2023 07:09) (125/78 - 154/91)  BP(mean): --  RR: 18 (17 Apr 2023 07:09) (16 - 18)  SpO2: 94% (17 Apr 2023 07:09) (94% - 100%)    Parameters below as of 17 Apr 2023 07:09  Patient On (Oxygen Delivery Method): room air      Vital Signs : BP        HR       RR                 Constitutional: well developed, well nourished, no deformities and no acute distress    Neurological: Alert & Oriented x 3, ANTONIO, no focal deficits    HEENT: NC/AT, PERRLA, EOMI,  Neck supple.    Respiratory: CTA B/L, No wheezing/crackles/rhonchi    Cardiovascular: (+) S1 & S2, RRR, No m/r/g    Gastrointestinal: soft, NT, nondistended, (+) BS    Genitourinary: non distended bladder, voiding freely    Extremities: No pedal edema, No clubbing, No cyanosis    Skin:  normal skin color and pigmentation, no skin lesions          LABS:                        15.4   7.32  )-----------( 272      ( 16 Apr 2023 10:45 )             45.8     04-16    138  |  106  |  19  ----------------------------<  141<H>  4.1   |  31  |  0.95    Ca    9.2      16 Apr 2023 10:45  Mg     2.3     04-16    TPro  7.2  /  Alb  3.9  /  TBili  0.9  /  DBili  x   /  AST  9<L>  /  ALT  33  /  AlkPhos  50  04-16          RADIOLOGY & ADDITIONAL STUDIES:    TELE:  EKG:  CXR:  echo: HPI:  63 yo male with PMHx of HLD, sleep apnea, not compliant with CPAP,  presents to the ED for weakness, lightheadedness that started yesterday morning.  Pt is a former smoker and does not exercise regularly. Yesterday after woke up from sleep, suddenly felt  lightheadedness, felt 'foggy'  upon standing and walking. Daughter is a nurse and states his HR was in the 40s. On arrival pt was also found to be bradycardic from 40-50bpm. He is not on any regular meds. No h/o thyroid disease.     Pt is resting in the bed, denies chest pain/SOB/palpitations/dizziness  Had exercise nuclear stress test today-result pending  Tele: SB 40's during sleep, no pauses, 60-70's while awake  Echo (4/17/23) LVEF 50-55%  EKG: SB 51bpm, sinus arrhythmia, AL 146ms, QRS 92ms, QT 484ms. QTC 446ms          PAST MEDICAL & SURGICAL HISTORY:  S/P Tonsillectomy      S/P Hernia Surgery          FAMILY HISTORY: (+) CAD      SOCIAL HISTORY: drinks a few beers daily, no drug use. Former smoker  Allergies    No Known Allergies        MEDICATIONS  (PRN):  acetaminophen     Tablet .. 650 milliGRAM(s) Oral every 6 hours PRN Temp greater or equal to 38C (100.4F), Mild Pain (1 - 3)  aluminum hydroxide/magnesium hydroxide/simethicone Suspension 30 milliLiter(s) Oral every 4 hours PRN Dyspepsia  melatonin 3 milliGRAM(s) Oral at bedtime PRN Insomnia  ondansetron Injectable 4 milliGRAM(s) IV Push every 8 hours PRN Nausea and/or Vomiting    ROS: All other ROS is negative unless indicated above.      	  Physical Exam:  Vital Signs Last 24 Hrs  T(C): 36.7 (17 Apr 2023 07:09), Max: 36.8 (16 Apr 2023 14:55)  T(F): 98.1 (17 Apr 2023 07:09), Max: 98.2 (16 Apr 2023 14:55)  HR: 49 (17 Apr 2023 07:09) (49 - 54)  BP: 134/79 (17 Apr 2023 07:09) (125/78 - 154/91)  RR: 18 (17 Apr 2023 07:09) (16 - 18)  SpO2: 94% (17 Apr 2023 07:09) (94% - 100%)    Parameters below as of 17 Apr 2023 07:09  Patient On (Oxygen Delivery Method): room air       Constitutional: well developed,  no deformities and no acute distress    Neurological: Alert & Oriented x 3, ANTONIO, no focal deficits    HEENT: NC/AT, PERRLA, EOMI,  Neck supple.    Respiratory: CTA B/L, No wheezing/crackles/rhonchi    Cardiovascular: (+) S1 & S2, RRR, No m/r/g    Gastrointestinal: soft, NT, nondistended, (+) BS    Genitourinary: non distended bladder, voiding freely    Extremities: No pedal edema, No clubbing, No cyanosis    Skin:  normal skin color and pigmentation, no skin lesions          LABS:                        15.4   7.32  )-----------( 272      ( 16 Apr 2023 10:45 )             45.8     04-16    138  |  106  |  19  ----------------------------<  141<H>  4.1   |  31  |  0.95    Ca    9.2      16 Apr 2023 10:45  Mg     2.3     04-16    TPro  7.2  /  Alb  3.9  /  TBili  0.9  /  DBili  x   /  AST  9<L>  /  ALT  33  /  AlkPhos  50  04-16         HPI:  63 yo male with PMHx of HLD, sleep apnea, not compliant with CPAP,  presents to the ED for weakness, lightheadedness that started yesterday morning.  Pt is a former smoker and does not exercise regularly. Yesterday after woke up from sleep, suddenly felt  lightheadedness, felt 'foggy'  upon standing and walking. Daughter is a nurse and states his HR was in the 40s. On arrival pt was also found to be bradycardic from 40-50bpm. He is not on any regular meds. No h/o thyroid disease.     Pt is resting in the bed, denies chest pain/SOB/palpitations/dizziness  Had exercise nuclear stress test today-result pending  Tele: SB 40's during sleep, no pauses, 60-70's while awake  Echo (4/17/23) LVEF 50-55%  EKG: SB 51bpm, sinus arrhythmia, MN 146ms, QRS 92ms, QT 484ms. QTC 446ms          PAST MEDICAL & SURGICAL HISTORY:  S/P Tonsillectomy      S/P Hernia Surgery          FAMILY HISTORY: (+) CAD      SOCIAL HISTORY: drinks a few beers daily, no drug use. Former smoker  Allergies    No Known Allergies        MEDICATIONS  (PRN):  acetaminophen     Tablet .. 650 milliGRAM(s) Oral every 6 hours PRN Temp greater or equal to 38C (100.4F), Mild Pain (1 - 3)  aluminum hydroxide/magnesium hydroxide/simethicone Suspension 30 milliLiter(s) Oral every 4 hours PRN Dyspepsia  melatonin 3 milliGRAM(s) Oral at bedtime PRN Insomnia  ondansetron Injectable 4 milliGRAM(s) IV Push every 8 hours PRN Nausea and/or Vomiting    ROS: All other ROS is negative unless indicated above.      	  Physical Exam:  Vital Signs Last 24 Hrs  T(C): 36.7 (17 Apr 2023 07:09), Max: 36.8 (16 Apr 2023 14:55)  T(F): 98.1 (17 Apr 2023 07:09), Max: 98.2 (16 Apr 2023 14:55)  HR: 49 (17 Apr 2023 07:09) (49 - 54)  BP: 134/79 (17 Apr 2023 07:09) (125/78 - 154/91)  RR: 18 (17 Apr 2023 07:09) (16 - 18)  SpO2: 94% (17 Apr 2023 07:09) (94% - 100%)    Parameters below as of 17 Apr 2023 07:09  Patient On (Oxygen Delivery Method): room air       Constitutional: well developed,  no deformities and no acute distress    Neurological: Alert & Oriented x 3, ANTONIO, no focal deficits    HEENT: NC/AT, PERRLA, EOMI,  Neck supple.    Respiratory: CTA B/L, No wheezing/crackles/rhonchi    Cardiovascular: (+) S1 & S2, RRR, No m/r/g    Gastrointestinal: soft, NT, nondistended, (+) BS    Genitourinary: non distended bladder, voiding freely    Extremities: No pedal edema, No clubbing, No cyanosis    Skin:  normal skin color and pigmentation, no skin lesions          LABS:                        15.4   7.32  )-----------( 272      ( 16 Apr 2023 10:45 )             45.8     04-16    138  |  106  |  19  ----------------------------<  141<H>  4.1   |  31  |  0.95    Ca    9.2      16 Apr 2023 10:45  Mg     2.3     04-16    TPro  7.2  /  Alb  3.9  /  TBili  0.9  /  DBili  x   /  AST  9<L>  /  ALT  33  /  AlkPhos  50  04-16    TSH: 1.15       HPI:  63 yo male with PMHx of HLD, sleep apnea, not compliant with CPAP,  presents to the ED for weakness, lightheadedness that started yesterday morning.  Pt is a former smoker and does not exercise regularly. Yesterday after woke up from sleep, suddenly felt  lightheadedness, felt 'foggy'  upon standing and walking. Daughter is a nurse and states his HR was in the 40s. On arrival pt was also found to be bradycardic from 40-50bpm. He is not on any regular meds. No h/o thyroid disease.     Pt is resting in the bed, denies chest pain/SOB/palpitations/dizziness, denies prior h/o syncope.  Had exercise nuclear stress test today-result pending  Tele: SB 40's during sleep, no pauses, 60-70's while awake  Echo (4/17/23) LVEF 50-55%  EKG: SB 51bpm, sinus arrhythmia, OR 146ms, QRS 92ms, QT 484ms. QTC 446ms          PAST MEDICAL & SURGICAL HISTORY:  S/P Tonsillectomy      S/P Hernia Surgery          FAMILY HISTORY: (+) CAD      SOCIAL HISTORY: drinks a few beers daily, no drug use. Former smoker  Allergies    No Known Allergies        MEDICATIONS  (PRN):  acetaminophen     Tablet .. 650 milliGRAM(s) Oral every 6 hours PRN Temp greater or equal to 38C (100.4F), Mild Pain (1 - 3)  aluminum hydroxide/magnesium hydroxide/simethicone Suspension 30 milliLiter(s) Oral every 4 hours PRN Dyspepsia  melatonin 3 milliGRAM(s) Oral at bedtime PRN Insomnia  ondansetron Injectable 4 milliGRAM(s) IV Push every 8 hours PRN Nausea and/or Vomiting    ROS: All other ROS is negative unless indicated above.      	  Physical Exam:  Vital Signs Last 24 Hrs  T(C): 36.7 (17 Apr 2023 07:09), Max: 36.8 (16 Apr 2023 14:55)  T(F): 98.1 (17 Apr 2023 07:09), Max: 98.2 (16 Apr 2023 14:55)  HR: 49 (17 Apr 2023 07:09) (49 - 54)  BP: 134/79 (17 Apr 2023 07:09) (125/78 - 154/91)  RR: 18 (17 Apr 2023 07:09) (16 - 18)  SpO2: 94% (17 Apr 2023 07:09) (94% - 100%)    Parameters below as of 17 Apr 2023 07:09  Patient On (Oxygen Delivery Method): room air       Constitutional: well developed,  no deformities and no acute distress    Neurological: Alert & Oriented x 3, ANTONIO, no focal deficits    HEENT: NC/AT, PERRLA, EOMI,  Neck supple.    Respiratory: CTA B/L, No wheezing/crackles/rhonchi    Cardiovascular: (+) S1 & S2, RRR, No m/r/g    Gastrointestinal: soft, NT, nondistended, (+) BS    Genitourinary: non distended bladder, voiding freely    Extremities: No pedal edema, No clubbing, No cyanosis    Skin:  normal skin color and pigmentation, no skin lesions          LABS:                        15.4   7.32  )-----------( 272      ( 16 Apr 2023 10:45 )             45.8     04-16    138  |  106  |  19  ----------------------------<  141<H>  4.1   |  31  |  0.95    Ca    9.2      16 Apr 2023 10:45  Mg     2.3     04-16    TPro  7.2  /  Alb  3.9  /  TBili  0.9  /  DBili  x   /  AST  9<L>  /  ALT  33  /  AlkPhos  50  04-16    TSH: 1.15

## 2023-04-17 NOTE — PROGRESS NOTE ADULT - PROBLEM SELECTOR PLAN 2
·  Problem: CAD (coronary artery disease).   ·  Recommendation: increased cardiac calcium score , multiple vessel mild nonobstructive calcified non calcified coronary disease , LAD 30%    encourage patient to be compliant to atorvastatin ,

## 2023-04-17 NOTE — DISCHARGE NOTE PROVIDER - CARE PROVIDER_API CALL
Danny Hughes)  Internal Medicine; Pulmonary Disease  [address] [facility], [address] [place] [address]  Phone: [phone]  Fax: [phone]  Scheduled Appointment: 04/21/2023    Reese Wilson)  Cardiology  [address] [place] [address]  Phone: [phone]  Fax: [phone]  Follow Up Time: 1 week

## 2023-04-17 NOTE — DISCHARGE NOTE PROVIDER - HOSPITAL COURSE
61 yo man with HLD, nonobstructive CAD (elevated Ca score), NURIA non-adherent to CPAP, presented to the ED for generalized weakness, lightheadedness that started when he awoke in the morning. He suddenly felt lightheadedness and 'foggy'  upon standing and walking. He is not on any regular meds. No h/o thyroid disease. His heart rate was checked and found to be 40s-50s. In the ED, vitals 133/87, HR 50, RR, O2 WNL. Afebrile. Exam unremarkable aside from regular bradycardia. Labs WNL, including CBC, chem, troponin, TSH. COVID19 and RVP negative. CT head negative. EKG sinus domenic, 51 bpm,  NE 146ms, QRS 92ms, QT 484ms. QTC 446ms. Patient was placed on tele and triaged to [facility].    Sinus bradycardia, known non-occlusive CAD, HLD, NURIA  Today patient is without any cardiac complaints. On tele he is noted to have sinus rhythm, rate 40s-60s, typically higher in that range when awake and lower in that range when asleep. No pauses. He had stress testing down today. He had a normal chronotropic response (appropriate increase in heart rate with exercise). Nuclear imaging negative for ischemia. No scan evidence of reversible or fixed perfusion defects. Normal left ventricular contractility with an ejection fraction of 57%. No regional wall motion abnormalities. Echocardiogram done as well. LV normal in size, wall thickness, wall motion and contractility. Estimated LVEF 50-55%.  Normal appearing left atrium. Trivial AI. Trace MR. Trace TR. Patient seen by Cardiology and Cardiac EP. Cleared for DC home. Advised adherence to CPAP. Outpatient follow up with PCP, Pulmonary Medicine Dr Hughes, Cardiology Dr Wilson or one of his colleagues Dr. Palla Dr. Boglioli Dr. Moulinie. May benefit from outpatient MCOT. Restart atorvastatin 40mg po daily at bedtime.     Discharge Exam:  T(F): 98.1 (17 Apr 2023 07:09), Max: 98.2 (16 Apr 2023 14:55)  HR: 49 (17 Apr 2023 07:09) (49 - 54)  BP: 134/79 (17 Apr 2023 07:09) (125/78 - 154/91)  RR: 18 (17 Apr 2023 07:09) (16 - 18)  SpO2: 94% (17 Apr 2023 07:09) (94% - 100%) on room air  Constitutional: well developed,  no deformities and no acute distress  HEENT: NC/AT, PERRLA, EOMI,  Neck supple.  Respiratory: CTA B/L, No wheezing/crackles/rhonchi  Cardiovascular: (+) S1 & S2, regular, bradycardic HR ~50, no m/r/g  Gastrointestinal: soft, NT, nondistended, (+) BS  Genitourinary: non distended bladder, voiding freely  Extremities: No pedal edema, No clubbing, No cyanosis  Skin:  normal skin color and pigmentation, no skin lesions  Neurological: Alert & Oriented x 3, ANTONIO, no focal deficits    Labs:                        15.4   7.32 )-----------( 272             45.8       138  |  106  |  19  -----------------------<  141  4.1   |  31  |  0.95    Ca 9.2  Mg 2.3    TPro  7.2  /  Alb  3.9  /  TBili  0.9  /  DBili  x   /  AST  9  /  ALT  33  /  AlkPhos  50        Micro:  COVID19 PCR, resp pathogens PCR negative 4/16    Imaging:  CT head: No evidence of acute intracranial hemorrhage, midline shift or CT evidence of acute territorial infarct.    Cardiac Testing:  Exercise stress MPI 4/17: Nuclear imaging negative for ischemia. No scan evidence of reversible or fixed perfusion defects. Normal left ventricular contractility with an ejection fraction of 57%. No regional wall motion abnormalities.     Echo: Echocardiogram done as well. LV normal in size, wall thickness, wall motion and contractility. Estimated LVEF 50-55%.  Normal appearing left atrium. Trivial AI. Trace MR. Trace TR.    Tele: SB 40's during sleep, no pauses, 60-70's while awake    EKG: SB 51bpm, sinus arrhythmia, NE 146ms, QRS 92ms, QT 484ms. QTC 446ms 61 yo man with HLD, nonobstructive CAD (elevated Ca score), NURIA non-adherent to CPAP, presented to the ED for generalized weakness, lightheadedness that started when he awoke in the morning. He suddenly felt lightheadedness and 'foggy'  upon standing and walking. He is not on any regular meds. No h/o thyroid disease. His heart rate was checked and found to be 40s-50s. In the ED, vitals 133/87, HR 50, RR, O2 WNL. Afebrile. Exam unremarkable aside from regular bradycardia. Labs WNL, including CBC, chem, troponin, TSH. COVID19 and RVP negative. CT head negative. EKG sinus domenic, 51 bpm,  MS 146ms, QRS 92ms, QT 484ms. QTC 446ms. Patient was placed on tele and triaged to [facility].    Lightheadedness, sinus bradycardia, known non-occlusive CAD, HLD, NURIA  Unable to definitively determine causality of lightheadedness. Unable to rule out lightheadedness due to bradycardia and unable to rule out lightheadedness due to NURIA (more likely cause).Today patient is without any cardiac complaints. On tele he is noted to have sinus rhythm, rate 40s-60s, typically higher in that range when awake and lower in that range when asleep. No pauses. He had stress testing down today. He had a normal chronotropic response (appropriate increase in heart rate with exercise). Nuclear imaging negative for ischemia. No scan evidence of reversible or fixed perfusion defects. Normal left ventricular contractility with an ejection fraction of 57%. No regional wall motion abnormalities. Echocardiogram done as well. LV normal in size, wall thickness, wall motion and contractility. Estimated LVEF 50-55%.  Normal appearing left atrium. Trivial AI. Trace MR. Trace TR. Patient seen by Cardiology and Cardiac EP. Cleared for DC home. Advised adherence to CPAP. Outpatient follow up with PCP, Pulmonary Medicine Dr Hughes, Cardiology Dr Wilson or one of his colleagues Dr. Palla Dr. Boglioli Dr. [place]. May benefit from outpatient MCOT. Restart atorvastatin 40mg po daily at bedtime.     Discharge Exam:  T(F): 98.1 (17 Apr 2023 07:09), Max: 98.2 (16 Apr 2023 14:55)  HR: 49 (17 Apr 2023 07:09) (49 - 54)  BP: 134/79 (17 Apr 2023 07:09) (125/78 - 154/91)  RR: 18 (17 Apr 2023 07:09) (16 - 18)  SpO2: 94% (17 Apr 2023 07:09) (94% - 100%) on room air  Constitutional: well developed,  no deformities and no acute distress  HEENT: NC/AT, PERRLA, EOMI,  Neck supple.  Respiratory: CTA B/L, No wheezing/crackles/rhonchi  Cardiovascular: (+) S1 & S2, regular, bradycardic HR ~50, no m/r/g  Gastrointestinal: soft, NT, nondistended, (+) BS  Genitourinary: non distended bladder, voiding freely  Extremities: No pedal edema, No clubbing, No cyanosis  Skin:  normal skin color and pigmentation, no skin lesions  Neurological: Alert & Oriented x 3, ANTONIO, no focal deficits    Labs:                        15.4   7.32 )-----------( 272             45.8       138  |  106  |  19  -----------------------<  141  4.1   |  31  |  0.95    Ca 9.2  Mg 2.3    TPro  7.2  /  Alb  3.9  /  TBili  0.9  /  DBili  x   /  AST  9  /  ALT  33  /  AlkPhos  50        Micro:  COVID19 PCR, resp pathogens PCR negative 4/16    Imaging:  CT head: No evidence of acute intracranial hemorrhage, midline shift or CT evidence of acute territorial infarct.    Cardiac Testing:  Exercise stress MPI 4/17: Nuclear imaging negative for ischemia. No scan evidence of reversible or fixed perfusion defects. Normal left ventricular contractility with an ejection fraction of 57%. No regional wall motion abnormalities.     Echo: Echocardiogram done as well. LV normal in size, wall thickness, wall motion and contractility. Estimated LVEF 50-55%.  Normal appearing left atrium. Trivial AI. Trace MR. Trace TR.    Tele: SB 40's during sleep, no pauses, 60-70's while awake    EKG: SB 51bpm, sinus arrhythmia, MS 146ms, QRS 92ms, QT 484ms. QTC 446ms

## 2023-04-17 NOTE — DISCHARGE NOTE PROVIDER - PROVIDER TOKENS
PROVIDER:[TOKEN:[[id number]],SCHEDULEDAPPT:[04/21/2023]],PROVIDER:[TOKEN:[[id number]],FOLLOWUP:[1 week]]

## 2023-04-21 ENCOUNTER — APPOINTMENT (OUTPATIENT)
Dept: INTERNAL MEDICINE | Facility: CLINIC | Age: 63
End: 2023-04-21
Payer: COMMERCIAL

## 2023-04-21 VITALS
SYSTOLIC BLOOD PRESSURE: 122 MMHG | OXYGEN SATURATION: 96 % | BODY MASS INDEX: 28.63 KG/M2 | WEIGHT: 200 LBS | HEART RATE: 62 BPM | TEMPERATURE: 98.3 F | RESPIRATION RATE: 16 BRPM | HEIGHT: 70 IN | DIASTOLIC BLOOD PRESSURE: 74 MMHG

## 2023-04-21 DIAGNOSIS — R00.1 BRADYCARDIA, UNSPECIFIED: ICD-10-CM

## 2023-04-21 DIAGNOSIS — E78.2 MIXED HYPERLIPIDEMIA: ICD-10-CM

## 2023-04-21 DIAGNOSIS — Z00.00 ENCOUNTER FOR GENERAL ADULT MEDICAL EXAMINATION W/OUT ABNORMAL FINDINGS: ICD-10-CM

## 2023-04-21 PROCEDURE — 99396 PREV VISIT EST AGE 40-64: CPT | Mod: 25

## 2023-04-21 PROCEDURE — 99214 OFFICE O/P EST MOD 30 MIN: CPT | Mod: 25

## 2023-04-21 NOTE — HISTORY OF PRESENT ILLNESS
[FreeTextEntry1] : Patient presents for a yearly comprehensive physical exam.  [de-identified] : The patient feels relatively well at this time. He has a history of HLD and is maintained on rosuvastatin 40 mg once per day. He was originally on 20 mg and was bumped up to 40 mg by Dr. Francisco in cardiology. He had stopped taking the medication on his own for a while and then restarted on 4/17/2023. He initially was evaluated by Dr. Francisco in November 2021, and a workup was done which consisted of a carotid test and a coronary CT angiogram which showed no significant stenosis. The workup did show a coronary calcium score of 276.  He was eventually started on rosuvastatin in 2022, but he was not very compliant.\par \par  He was hospitalized on 4/16/2023, due to lightheadedness and a heart rate of approximately 43 bpm indicating brachycardia. During the brachycardiac event he denied chest pains, but he did complain of lightheadedness at home.. A workup including blood work, an EKG and a CAT scan of the head were done which all came back unremarkable.  He also had an echocardiogram performed and a nuclear stress test, both of which were unremarkable.  He was seen by his cardiology team, and it was felt that his bradycardia may have been due to untreated obstructive sleep apnea.  He was eventually discharged home on 4/17/2023.  He does not currently exercise. \par \par He has a history of obstructive sleep apnea, device was returned, due to the fact that he was noncompliant. He in noncompliant due to discomfort with the device. His wife notes that he is not snoring any longer.  He has lost almost 30 pounds over the past 2-1/2 years.\par \par He is up to date on his colonoscopy. The patient denies any other constitutional symptoms at this time. He now comes in for this assessment.

## 2023-04-21 NOTE — PLAN
[FreeTextEntry1] : 1. Continue current medications as previously outlined. \par \par 2. Stressed importance of cardiovascular exercise regimen.\par \par 3. Patient will undergo a repeat polysomnography to reassess his obstructive sleep apnea.  He states That he will now attempt to be compliant with the use of the CPAP device.\par \par 4. Continue following-up with Dr. Francisco in cardiology for management of HLD.\par \par 5. Patient will resume taking 40 mg of rosuvastatin daily.\par \par 6. Patient will undergo a lipid and hepatic panel in 3 months to assess efficacy of rosuvastatin.\par \par 7. Follow-up in 6 months with office visit and blood pressure check..

## 2023-04-21 NOTE — ADDENDUM
[FreeTextEntry1] : I, Manish Trevizo, documented this note as a scribe on behalf of Dr. Danny Hughes MD on 04/21/2023.

## 2023-04-21 NOTE — PHYSICAL EXAM
[General Appearance - Alert] : alert [General Appearance - In No Acute Distress] : in no acute distress [Sclera] : the sclera and conjunctiva were normal [PERRL With Normal Accommodation] : pupils were equal in size, round, and reactive to light [Extraocular Movements] : extraocular movements were intact [Outer Ear] : the ears and nose were normal in appearance [Oropharynx] : the oropharynx was normal [Neck Appearance] : the appearance of the neck was normal [Neck Cervical Mass (___cm)] : no neck mass was observed [Jugular Venous Distention Increased] : there was no jugular-venous distention [Thyroid Diffuse Enlargement] : the thyroid was not enlarged [Thyroid Nodule] : there were no palpable thyroid nodules [Auscultation Breath Sounds / Voice Sounds] : lungs were clear to auscultation bilaterally [Heart Rate And Rhythm] : heart rate was normal and rhythm regular [Heart Sounds] : normal S1 and S2 [Heart Sounds Gallop] : no gallops [Heart Sounds Pericardial Friction Rub] : no pericardial rub [Murmurs] : no murmurs [Arterial Pulses Carotid] : carotid pulses were normal with no bruits [Edema] : there was no peripheral edema [Bowel Sounds] : normal bowel sounds [Abdomen Soft] : soft [Abdomen Tenderness] : non-tender [Abdomen Mass (___ Cm)] : no abdominal mass palpated [Normal Sphincter Tone] : normal sphincter tone [No Rectal Mass] : no rectal mass [Prostate Enlargement] : the prostate was not enlarged [Prostate Tenderness] : the prostate was not tender [Cervical Lymph Nodes Enlarged Posterior Bilaterally] : posterior cervical [Cervical Lymph Nodes Enlarged Anterior Bilaterally] : anterior cervical [Supraclavicular Lymph Nodes Enlarged Bilaterally] : supraclavicular [No CVA Tenderness] : no ~M costovertebral angle tenderness [Nail Clubbing] : no clubbing  or cyanosis of the fingernails [] : no rash [Coordination Grossly Intact] : coordination grossly intact [Normal Gait] : normal gait [Normal Affect] : the affect was normal [Normal Insight/Judgement] : insight and judgment were intact [Occult Blood Positive] : stool was negative for occult blood [FreeTextEntry1] : Seborrheic keratoses are present. There are no suspicious looking hyperpigmented nevi.

## 2023-05-02 DIAGNOSIS — I25.10 ATHEROSCLEROTIC HEART DISEASE OF NATIVE CORONARY ARTERY WITHOUT ANGINA PECTORIS: ICD-10-CM

## 2023-05-02 DIAGNOSIS — Z91.148 PATIENT'S OTHER NONCOMPLIANCE WITH MEDICATION REGIMEN FOR OTHER REASON: ICD-10-CM

## 2023-05-02 DIAGNOSIS — G47.33 OBSTRUCTIVE SLEEP APNEA (ADULT) (PEDIATRIC): ICD-10-CM

## 2023-05-02 DIAGNOSIS — E78.5 HYPERLIPIDEMIA, UNSPECIFIED: ICD-10-CM

## 2023-05-02 DIAGNOSIS — R00.1 BRADYCARDIA, UNSPECIFIED: ICD-10-CM

## 2023-11-06 LAB
ALBUMIN SERPL ELPH-MCNC: 4.9 G/DL
ALP BLD-CCNC: 55 U/L
ALT SERPL-CCNC: 41 U/L
AST SERPL-CCNC: 23 U/L
BILIRUB DIRECT SERPL-MCNC: 0.2 MG/DL
BILIRUB INDIRECT SERPL-MCNC: 0.6 MG/DL
BILIRUB SERPL-MCNC: 0.8 MG/DL
CHOLEST SERPL-MCNC: 111 MG/DL
HDLC SERPL-MCNC: 45 MG/DL
LDLC SERPL CALC-MCNC: 52 MG/DL
NONHDLC SERPL-MCNC: 66 MG/DL
PROT SERPL-MCNC: 6.9 G/DL
TRIGL SERPL-MCNC: 58 MG/DL

## 2023-11-07 ENCOUNTER — APPOINTMENT (OUTPATIENT)
Dept: INTERNAL MEDICINE | Facility: CLINIC | Age: 63
End: 2023-11-07
Payer: COMMERCIAL

## 2023-11-07 VITALS
WEIGHT: 210 LBS | HEART RATE: 53 BPM | TEMPERATURE: 98.7 F | DIASTOLIC BLOOD PRESSURE: 80 MMHG | SYSTOLIC BLOOD PRESSURE: 122 MMHG | HEIGHT: 70 IN | OXYGEN SATURATION: 96 % | BODY MASS INDEX: 30.06 KG/M2 | RESPIRATION RATE: 16 BRPM

## 2023-11-07 DIAGNOSIS — G47.33 OBSTRUCTIVE SLEEP APNEA (ADULT) (PEDIATRIC): ICD-10-CM

## 2023-11-07 DIAGNOSIS — Z87.891 PERSONAL HISTORY OF NICOTINE DEPENDENCE: ICD-10-CM

## 2023-11-07 DIAGNOSIS — E78.5 HYPERLIPIDEMIA, UNSPECIFIED: ICD-10-CM

## 2023-11-07 DIAGNOSIS — E55.9 VITAMIN D DEFICIENCY, UNSPECIFIED: ICD-10-CM

## 2023-11-07 DIAGNOSIS — I25.10 ATHEROSCLEROTIC HEART DISEASE OF NATIVE CORONARY ARTERY W/OUT ANGINA PECTORIS: ICD-10-CM

## 2023-11-07 DIAGNOSIS — Z00.00 ENCOUNTER FOR GENERAL ADULT MEDICAL EXAMINATION W/OUT ABNORMAL FINDINGS: ICD-10-CM

## 2023-11-07 PROCEDURE — 90686 IIV4 VACC NO PRSV 0.5 ML IM: CPT

## 2023-11-07 PROCEDURE — G0008: CPT

## 2023-11-07 PROCEDURE — 99214 OFFICE O/P EST MOD 30 MIN: CPT | Mod: 25

## 2023-12-13 RX ORDER — ROSUVASTATIN CALCIUM 40 MG/1
40 TABLET, FILM COATED ORAL
Qty: 90 | Refills: 3 | Status: ACTIVE | COMMUNITY
Start: 2021-11-30 | End: 1900-01-01

## 2024-05-31 ENCOUNTER — APPOINTMENT (OUTPATIENT)
Dept: INTERNAL MEDICINE | Facility: CLINIC | Age: 64
End: 2024-05-31

## 2024-09-05 ENCOUNTER — NON-APPOINTMENT (OUTPATIENT)
Age: 64
End: 2024-09-05

## 2024-12-30 ENCOUNTER — APPOINTMENT (OUTPATIENT)
Dept: INTERNAL MEDICINE | Facility: CLINIC | Age: 64
End: 2024-12-30

## 2025-05-15 ENCOUNTER — RX RENEWAL (OUTPATIENT)
Age: 65
End: 2025-05-15